# Patient Record
Sex: FEMALE | Race: WHITE | NOT HISPANIC OR LATINO | Employment: OTHER | URBAN - METROPOLITAN AREA
[De-identification: names, ages, dates, MRNs, and addresses within clinical notes are randomized per-mention and may not be internally consistent; named-entity substitution may affect disease eponyms.]

---

## 2017-02-15 ENCOUNTER — APPOINTMENT (OUTPATIENT)
Dept: PHYSICAL THERAPY | Facility: CLINIC | Age: 62
DRG: 194 | End: 2017-02-15
Attending: FAMILY MEDICINE
Payer: COMMERCIAL

## 2017-02-15 ENCOUNTER — HOSPITAL ENCOUNTER (INPATIENT)
Facility: CLINIC | Age: 62
LOS: 1 days | Discharge: HOME HEALTH | DRG: 194 | End: 2017-02-16
Attending: FAMILY MEDICINE | Admitting: FAMILY MEDICINE
Payer: COMMERCIAL

## 2017-02-15 ENCOUNTER — TRANSFERRED RECORDS (OUTPATIENT)
Dept: HEALTH INFORMATION MANAGEMENT | Facility: CLINIC | Age: 62
End: 2017-02-15

## 2017-02-15 DIAGNOSIS — J44.1 OBSTRUCTIVE CHRONIC BRONCHITIS WITH EXACERBATION (H): Primary | ICD-10-CM

## 2017-02-15 DIAGNOSIS — J10.1 INFLUENZA A: ICD-10-CM

## 2017-02-15 DIAGNOSIS — M25.571 RIGHT ANKLE PAIN, UNSPECIFIED CHRONICITY: ICD-10-CM

## 2017-02-15 PROBLEM — A41.9 SEPSIS (H): Status: ACTIVE | Noted: 2017-02-15

## 2017-02-15 LAB
ALBUMIN SERPL-MCNC: 3.1 G/DL (ref 3.4–5)
ALBUMIN UR-MCNC: NEGATIVE MG/DL
ALP SERPL-CCNC: 79 U/L (ref 40–150)
ALT SERPL W P-5'-P-CCNC: 24 U/L (ref 0–50)
ANION GAP SERPL CALCULATED.3IONS-SCNC: 12 MMOL/L (ref 3–14)
APPEARANCE UR: CLEAR
AST SERPL W P-5'-P-CCNC: 18 U/L (ref 0–45)
BASOPHILS # BLD AUTO: 0 10E9/L (ref 0–0.2)
BASOPHILS NFR BLD AUTO: 0.2 %
BILIRUB SERPL-MCNC: 0.4 MG/DL (ref 0.2–1.3)
BILIRUB UR QL STRIP: NEGATIVE
BUN SERPL-MCNC: 12 MG/DL (ref 7–30)
CALCIUM SERPL-MCNC: 8.4 MG/DL (ref 8.5–10.1)
CHLORIDE SERPL-SCNC: 102 MMOL/L (ref 94–109)
CO2 SERPL-SCNC: 23 MMOL/L (ref 20–32)
COLOR UR AUTO: YELLOW
CREAT SERPL-MCNC: 0.64 MG/DL (ref 0.52–1.04)
DIFFERENTIAL METHOD BLD: ABNORMAL
EOSINOPHIL # BLD AUTO: 0 10E9/L (ref 0–0.7)
EOSINOPHIL NFR BLD AUTO: 0 %
ERYTHROCYTE [DISTWIDTH] IN BLOOD BY AUTOMATED COUNT: 13.6 % (ref 10–15)
GFR SERPL CREATININE-BSD FRML MDRD: ABNORMAL ML/MIN/1.7M2
GLUCOSE BLDC GLUCOMTR-MCNC: 282 MG/DL (ref 70–99)
GLUCOSE BLDC GLUCOMTR-MCNC: 299 MG/DL (ref 70–99)
GLUCOSE BLDC GLUCOMTR-MCNC: 343 MG/DL (ref 70–99)
GLUCOSE SERPL-MCNC: 383 MG/DL (ref 70–99)
GLUCOSE UR STRIP-MCNC: >=1000 MG/DL
HCT VFR BLD AUTO: 34 % (ref 35–47)
HGB BLD-MCNC: 11.2 G/DL (ref 11.7–15.7)
HGB UR QL STRIP: NEGATIVE
IMM GRANULOCYTES # BLD: 0 10E9/L (ref 0–0.4)
IMM GRANULOCYTES NFR BLD: 0.2 %
KETONES UR STRIP-MCNC: NEGATIVE MG/DL
LACTATE BLD-SCNC: 4.2 MMOL/L (ref 0.7–2.1)
LEUKOCYTE ESTERASE UR QL STRIP: NEGATIVE
LYMPHOCYTES # BLD AUTO: 1.1 10E9/L (ref 0.8–5.3)
LYMPHOCYTES NFR BLD AUTO: 24.6 %
MCH RBC QN AUTO: 29.9 PG (ref 26.5–33)
MCHC RBC AUTO-ENTMCNC: 32.9 G/DL (ref 31.5–36.5)
MCV RBC AUTO: 91 FL (ref 78–100)
MONOCYTES # BLD AUTO: 0.1 10E9/L (ref 0–1.3)
MONOCYTES NFR BLD AUTO: 1.8 %
NEUTROPHILS # BLD AUTO: 3.3 10E9/L (ref 1.6–8.3)
NEUTROPHILS NFR BLD AUTO: 73.2 %
NITRATE UR QL: NEGATIVE
PH UR STRIP: 6 PH (ref 5–7)
PLATELET # BLD AUTO: 216 10E9/L (ref 150–450)
POTASSIUM SERPL-SCNC: 4.3 MMOL/L (ref 3.4–5.3)
PROT SERPL-MCNC: 7.5 G/DL (ref 6.8–8.8)
RBC # BLD AUTO: 3.75 10E12/L (ref 3.8–5.2)
RBC #/AREA URNS AUTO: ABNORMAL /HPF (ref 0–2)
SODIUM SERPL-SCNC: 137 MMOL/L (ref 133–144)
SP GR UR STRIP: 1.02 (ref 1–1.03)
URN SPEC COLLECT METH UR: ABNORMAL
UROBILINOGEN UR STRIP-ACNC: 0.2 EU/DL (ref 0.2–1)
WBC # BLD AUTO: 4.6 10E9/L (ref 4–11)
WBC #/AREA URNS AUTO: ABNORMAL /HPF (ref 0–2)

## 2017-02-15 PROCEDURE — 25000132 ZZH RX MED GY IP 250 OP 250 PS 637: Performed by: FAMILY MEDICINE

## 2017-02-15 PROCEDURE — 25000125 ZZHC RX 250: Performed by: FAMILY MEDICINE

## 2017-02-15 PROCEDURE — 80053 COMPREHEN METABOLIC PANEL: CPT | Performed by: FAMILY MEDICINE

## 2017-02-15 PROCEDURE — 87040 BLOOD CULTURE FOR BACTERIA: CPT | Performed by: FAMILY MEDICINE

## 2017-02-15 PROCEDURE — 99207 ZZC MOONLIGHTING INDICATOR: CPT | Performed by: FAMILY MEDICINE

## 2017-02-15 PROCEDURE — 00000146 ZZHCL STATISTIC GLUCOSE BY METER IP

## 2017-02-15 PROCEDURE — 97161 PT EVAL LOW COMPLEX 20 MIN: CPT | Mod: GP

## 2017-02-15 PROCEDURE — 36415 COLL VENOUS BLD VENIPUNCTURE: CPT | Performed by: FAMILY MEDICINE

## 2017-02-15 PROCEDURE — 97110 THERAPEUTIC EXERCISES: CPT | Mod: GP

## 2017-02-15 PROCEDURE — 25000131 ZZH RX MED GY IP 250 OP 636 PS 637: Performed by: FAMILY MEDICINE

## 2017-02-15 PROCEDURE — 25000128 H RX IP 250 OP 636: Performed by: FAMILY MEDICINE

## 2017-02-15 PROCEDURE — 12000000 ZZH R&B MED SURG/OB

## 2017-02-15 PROCEDURE — 83605 ASSAY OF LACTIC ACID: CPT | Performed by: FAMILY MEDICINE

## 2017-02-15 PROCEDURE — 97530 THERAPEUTIC ACTIVITIES: CPT | Mod: GP

## 2017-02-15 PROCEDURE — 87081 CULTURE SCREEN ONLY: CPT | Performed by: FAMILY MEDICINE

## 2017-02-15 PROCEDURE — 99223 1ST HOSP IP/OBS HIGH 75: CPT | Mod: AI | Performed by: FAMILY MEDICINE

## 2017-02-15 PROCEDURE — 85025 COMPLETE CBC W/AUTO DIFF WBC: CPT | Performed by: FAMILY MEDICINE

## 2017-02-15 PROCEDURE — 40000193 ZZH STATISTIC PT WARD VISIT

## 2017-02-15 PROCEDURE — 81001 URINALYSIS AUTO W/SCOPE: CPT | Performed by: FAMILY MEDICINE

## 2017-02-15 RX ORDER — ATENOLOL 50 MG/1
50 TABLET ORAL DAILY
COMMUNITY
Start: 2017-02-02

## 2017-02-15 RX ORDER — ALBUTEROL SULFATE 90 UG/1
2 AEROSOL, METERED RESPIRATORY (INHALATION) EVERY 4 HOURS PRN
COMMUNITY
Start: 2017-02-02

## 2017-02-15 RX ORDER — CETIRIZINE HYDROCHLORIDE 10 MG/1
10 TABLET ORAL DAILY
Status: DISCONTINUED | OUTPATIENT
Start: 2017-02-15 | End: 2017-02-16 | Stop reason: HOSPADM

## 2017-02-15 RX ORDER — FUROSEMIDE 20 MG
20 TABLET ORAL EVERY MORNING
Status: DISCONTINUED | OUTPATIENT
Start: 2017-02-15 | End: 2017-02-16 | Stop reason: HOSPADM

## 2017-02-15 RX ORDER — ONDANSETRON 2 MG/ML
4 INJECTION INTRAMUSCULAR; INTRAVENOUS EVERY 6 HOURS PRN
Status: DISCONTINUED | OUTPATIENT
Start: 2017-02-15 | End: 2017-02-16 | Stop reason: HOSPADM

## 2017-02-15 RX ORDER — NICOTINE POLACRILEX 4 MG
15-30 LOZENGE BUCCAL
Status: DISCONTINUED | OUTPATIENT
Start: 2017-02-15 | End: 2017-02-16 | Stop reason: HOSPADM

## 2017-02-15 RX ORDER — AMLODIPINE BESYLATE 10 MG/1
10 TABLET ORAL
Status: DISCONTINUED | OUTPATIENT
Start: 2017-02-15 | End: 2017-02-16 | Stop reason: HOSPADM

## 2017-02-15 RX ORDER — AMOXICILLIN 250 MG
1 CAPSULE ORAL 2 TIMES DAILY PRN
COMMUNITY
Start: 2016-12-22

## 2017-02-15 RX ORDER — CITALOPRAM HYDROBROMIDE 40 MG/1
40 TABLET ORAL DAILY
COMMUNITY
Start: 2017-02-02

## 2017-02-15 RX ORDER — OXYCODONE HYDROCHLORIDE 5 MG/1
1-2 TABLET ORAL 2 TIMES DAILY PRN
COMMUNITY
Start: 2017-02-08

## 2017-02-15 RX ORDER — DEXTROSE MONOHYDRATE 25 G/50ML
25-50 INJECTION, SOLUTION INTRAVENOUS
Status: DISCONTINUED | OUTPATIENT
Start: 2017-02-15 | End: 2017-02-16 | Stop reason: HOSPADM

## 2017-02-15 RX ORDER — IBUPROFEN 600 MG/1
600 TABLET, FILM COATED ORAL EVERY 6 HOURS PRN
Status: DISCONTINUED | OUTPATIENT
Start: 2017-02-15 | End: 2017-02-16 | Stop reason: HOSPADM

## 2017-02-15 RX ORDER — ONDANSETRON 4 MG/1
4 TABLET, ORALLY DISINTEGRATING ORAL EVERY 6 HOURS PRN
Status: DISCONTINUED | OUTPATIENT
Start: 2017-02-15 | End: 2017-02-16 | Stop reason: HOSPADM

## 2017-02-15 RX ORDER — LOVASTATIN 20 MG
20 TABLET ORAL DAILY
COMMUNITY
Start: 2017-02-02

## 2017-02-15 RX ORDER — ALBUTEROL SULFATE 0.83 MG/ML
2.5 SOLUTION RESPIRATORY (INHALATION) EVERY 6 HOURS PRN
Status: DISCONTINUED | OUTPATIENT
Start: 2017-02-15 | End: 2017-02-16 | Stop reason: HOSPADM

## 2017-02-15 RX ORDER — LISINOPRIL 20 MG/1
20 TABLET ORAL DAILY
COMMUNITY
Start: 2017-02-02

## 2017-02-15 RX ORDER — LISINOPRIL 10 MG/1
20 TABLET ORAL DAILY
Status: DISCONTINUED | OUTPATIENT
Start: 2017-02-15 | End: 2017-02-16 | Stop reason: HOSPADM

## 2017-02-15 RX ORDER — OSELTAMIVIR PHOSPHATE 75 MG/1
75 CAPSULE ORAL 2 TIMES DAILY
Status: DISCONTINUED | OUTPATIENT
Start: 2017-02-15 | End: 2017-02-16 | Stop reason: HOSPADM

## 2017-02-15 RX ORDER — CITALOPRAM HYDROBROMIDE 20 MG/1
40 TABLET ORAL DAILY
Status: DISCONTINUED | OUTPATIENT
Start: 2017-02-15 | End: 2017-02-16 | Stop reason: HOSPADM

## 2017-02-15 RX ORDER — PROCHLORPERAZINE MALEATE 5 MG
5-10 TABLET ORAL EVERY 6 HOURS PRN
Status: DISCONTINUED | OUTPATIENT
Start: 2017-02-15 | End: 2017-02-16 | Stop reason: HOSPADM

## 2017-02-15 RX ORDER — ASPIRIN 81 MG/1
81 TABLET ORAL DAILY
COMMUNITY

## 2017-02-15 RX ORDER — AMLODIPINE BESYLATE 10 MG/1
10 TABLET ORAL
COMMUNITY
Start: 2017-02-02

## 2017-02-15 RX ORDER — POLYETHYLENE GLYCOL 3350 17 G/17G
17 POWDER, FOR SOLUTION ORAL DAILY
Status: DISCONTINUED | OUTPATIENT
Start: 2017-02-15 | End: 2017-02-16 | Stop reason: HOSPADM

## 2017-02-15 RX ORDER — ALBUTEROL SULFATE 0.83 MG/ML
2.5 SOLUTION RESPIRATORY (INHALATION) EVERY 6 HOURS PRN
COMMUNITY
Start: 2017-02-13 | End: 2019-04-06

## 2017-02-15 RX ORDER — LAMOTRIGINE 100 MG/1
100 TABLET ORAL 2 TIMES DAILY
Status: DISCONTINUED | OUTPATIENT
Start: 2017-02-15 | End: 2017-02-16 | Stop reason: HOSPADM

## 2017-02-15 RX ORDER — FUROSEMIDE 20 MG
20 TABLET ORAL EVERY MORNING
COMMUNITY
Start: 2017-02-08

## 2017-02-15 RX ORDER — ASPIRIN 81 MG/1
81 TABLET ORAL DAILY
Status: DISCONTINUED | OUTPATIENT
Start: 2017-02-15 | End: 2017-02-16 | Stop reason: HOSPADM

## 2017-02-15 RX ORDER — PROCHLORPERAZINE 25 MG
25 SUPPOSITORY, RECTAL RECTAL EVERY 12 HOURS PRN
Status: DISCONTINUED | OUTPATIENT
Start: 2017-02-15 | End: 2017-02-16 | Stop reason: HOSPADM

## 2017-02-15 RX ORDER — ACETAMINOPHEN 325 MG/1
650 TABLET ORAL EVERY 4 HOURS PRN
Status: DISCONTINUED | OUTPATIENT
Start: 2017-02-15 | End: 2017-02-16 | Stop reason: HOSPADM

## 2017-02-15 RX ORDER — OXYCODONE HYDROCHLORIDE 5 MG/1
5-10 TABLET ORAL 2 TIMES DAILY PRN
Status: DISCONTINUED | OUTPATIENT
Start: 2017-02-15 | End: 2017-02-16

## 2017-02-15 RX ORDER — ALBUTEROL SULFATE 90 UG/1
2 AEROSOL, METERED RESPIRATORY (INHALATION) EVERY 4 HOURS PRN
Status: DISCONTINUED | OUTPATIENT
Start: 2017-02-15 | End: 2017-02-15

## 2017-02-15 RX ORDER — ATENOLOL 50 MG/1
50 TABLET ORAL DAILY
Status: DISCONTINUED | OUTPATIENT
Start: 2017-02-15 | End: 2017-02-16 | Stop reason: HOSPADM

## 2017-02-15 RX ORDER — NALOXONE HYDROCHLORIDE 0.4 MG/ML
.1-.4 INJECTION, SOLUTION INTRAMUSCULAR; INTRAVENOUS; SUBCUTANEOUS
Status: DISCONTINUED | OUTPATIENT
Start: 2017-02-15 | End: 2017-02-16 | Stop reason: HOSPADM

## 2017-02-15 RX ORDER — LEVOFLOXACIN 5 MG/ML
750 INJECTION, SOLUTION INTRAVENOUS EVERY 24 HOURS
Status: DISCONTINUED | OUTPATIENT
Start: 2017-02-15 | End: 2017-02-16 | Stop reason: HOSPADM

## 2017-02-15 RX ORDER — PREDNISONE 20 MG/1
60 TABLET ORAL DAILY
Status: DISCONTINUED | OUTPATIENT
Start: 2017-02-15 | End: 2017-02-16 | Stop reason: HOSPADM

## 2017-02-15 RX ORDER — LAMOTRIGINE 100 MG/1
100 TABLET ORAL 2 TIMES DAILY
COMMUNITY
Start: 2017-02-08

## 2017-02-15 RX ORDER — SIMVASTATIN 5 MG
10 TABLET ORAL DAILY
Status: DISCONTINUED | OUTPATIENT
Start: 2017-02-15 | End: 2017-02-16 | Stop reason: HOSPADM

## 2017-02-15 RX ORDER — POLYETHYLENE GLYCOL 3350 17 G/17G
17 POWDER, FOR SOLUTION ORAL DAILY
COMMUNITY

## 2017-02-15 RX ADMIN — FUROSEMIDE 20 MG: 20 TABLET ORAL at 10:33

## 2017-02-15 RX ADMIN — OXYCODONE HYDROCHLORIDE 10 MG: 5 TABLET ORAL at 11:17

## 2017-02-15 RX ADMIN — OYSTER SHELL CALCIUM WITH VITAMIN D 1 TABLET: 500; 200 TABLET, FILM COATED ORAL at 17:39

## 2017-02-15 RX ADMIN — PREDNISONE 60 MG: 20 TABLET ORAL at 11:09

## 2017-02-15 RX ADMIN — TAZOBACTAM SODIUM AND PIPERACILLIN SODIUM 4.5 G: 500; 4 INJECTION, SOLUTION INTRAVENOUS at 12:21

## 2017-02-15 RX ADMIN — TAZOBACTAM SODIUM AND PIPERACILLIN SODIUM 4.5 G: 500; 4 INJECTION, SOLUTION INTRAVENOUS at 23:18

## 2017-02-15 RX ADMIN — OSELTAMIVIR PHOSPHATE 75 MG: 75 CAPSULE ORAL at 21:05

## 2017-02-15 RX ADMIN — ATENOLOL 50 MG: 50 TABLET ORAL at 10:32

## 2017-02-15 RX ADMIN — LEVOFLOXACIN 750 MG: 5 INJECTION, SOLUTION INTRAVENOUS at 10:42

## 2017-02-15 RX ADMIN — METFORMIN HYDROCHLORIDE 1000 MG: 500 TABLET ORAL at 11:09

## 2017-02-15 RX ADMIN — CITALOPRAM HYDROBROMIDE 40 MG: 20 TABLET ORAL at 10:32

## 2017-02-15 RX ADMIN — INSULIN ASPART 15 UNITS: 100 INJECTION, SOLUTION INTRAVENOUS; SUBCUTANEOUS at 17:47

## 2017-02-15 RX ADMIN — AMLODIPINE BESYLATE 10 MG: 10 TABLET ORAL at 17:38

## 2017-02-15 RX ADMIN — TAZOBACTAM SODIUM AND PIPERACILLIN SODIUM 4.5 G: 500; 4 INJECTION, SOLUTION INTRAVENOUS at 17:37

## 2017-02-15 RX ADMIN — METFORMIN HYDROCHLORIDE 1000 MG: 500 TABLET ORAL at 17:37

## 2017-02-15 RX ADMIN — LISINOPRIL 20 MG: 10 TABLET ORAL at 11:10

## 2017-02-15 RX ADMIN — INSULIN ASPART 15 UNITS: 100 INJECTION, SOLUTION INTRAVENOUS; SUBCUTANEOUS at 12:24

## 2017-02-15 RX ADMIN — OSELTAMIVIR PHOSPHATE 75 MG: 75 CAPSULE ORAL at 11:11

## 2017-02-15 RX ADMIN — SIMVASTATIN 10 MG: 5 TABLET, FILM COATED ORAL at 11:09

## 2017-02-15 RX ADMIN — OXYCODONE HYDROCHLORIDE 10 MG: 5 TABLET ORAL at 21:10

## 2017-02-15 RX ADMIN — LAMOTRIGINE 100 MG: 100 TABLET ORAL at 21:05

## 2017-02-15 RX ADMIN — FLUTICASONE PROPIONATE 1 PUFF: 250 POWDER, METERED RESPIRATORY (INHALATION) at 10:43

## 2017-02-15 RX ADMIN — LAMOTRIGINE 100 MG: 100 TABLET ORAL at 10:43

## 2017-02-15 RX ADMIN — OMEPRAZOLE 20 MG: 20 CAPSULE, DELAYED RELEASE ORAL at 11:11

## 2017-02-15 RX ADMIN — FLUTICASONE PROPIONATE 1 PUFF: 250 POWDER, METERED RESPIRATORY (INHALATION) at 17:39

## 2017-02-15 RX ADMIN — ASPIRIN 81 MG: 81 TABLET, COATED ORAL at 10:31

## 2017-02-15 ASSESSMENT — PAIN DESCRIPTION - DESCRIPTORS: DESCRIPTORS: ACHING

## 2017-02-15 ASSESSMENT — ACTIVITIES OF DAILY LIVING (ADL)
SWALLOWING: 0-->SWALLOWS FOODS/LIQUIDS WITHOUT DIFFICULTY
BATHING: 0-->INDEPENDENT
COGNITION: 0 - NO COGNITION ISSUES REPORTED
TRANSFERRING: 3-->ASSISTIVE EQUIPMENT AND PERSON
RETIRED_COMMUNICATION: 0-->UNDERSTANDS/COMMUNICATES WITHOUT DIFFICULTY
TOILETING: 2-->ASSISTIVE PERSON
DRESS: 0-->INDEPENDENT
AMBULATION: 3-->ASSISTIVE EQUIPMENT AND PERSON
RETIRED_EATING: 0-->INDEPENDENT
FALL_HISTORY_WITHIN_LAST_SIX_MONTHS: NO

## 2017-02-15 NOTE — PLAN OF CARE
Problem: Goal Outcome Summary  Goal: Goal Outcome Summary  Patient is a 61 year old year old female. Prior to admission, patient was living alone in an apartment with 15 stairs to enter, using wheelchair and front wheeled walker for mobility, and requiring Minimal assist for ADLs including laundry, cooking, cleaning. Currently, patient is requiring Stand by assistance for functional mobility and Stand by for ambulation using front wheeled walker. Barriers to return to previous living situation include None anticipated.  Patient equipment needs at discharge include None anticipated.  Recommend discharge to Home with resuming her prior Home PT with friend/neighbor transport.      Will plan on seeing pt tomorrow 1x to perform stair training to ensure safe discharge home, however pt appears to be near baseline status of mobility.        Freddie Bolivar, PT, DPT        2/15/2017      5:03 PM  [facility]  [phone]

## 2017-02-15 NOTE — H&P
[facility]    History and Physical  Hospitalist       Date of Admission:  2/15/2017    Assessment & Plan   Daly Walker is a 61 year old female who presents with Sepsis, influenza A and COPD exacerbation.    Active Problems:    1.  Sepsis (H)    Assessment: She presented with cough and fever and workup in the ER showed influenza A. She also displayed symptoms of COPD exacerbation. Her lactic acid level is elevated. He is not tachycardic and her O2 sat has been stable and normal. She was in a rehabilitation facility about 3.5 months and was discharged to home about 3 weeks ago.      Plan: She was admitted to the hospital for aggressive monitoring and management. Vital signs are stable. Pending for blood cultures. Chest x-ray in the ER was normal. She was started on antibiotic when she was in the ER. Will check UA. Broad spectrum antibiotic initiated until the source is identified. Monitored the lactic acid level closely.     2.   Influenza A    Assessment: She did not received the flu vaccination this year. She did not want any vaccination for now.  No respiratory distress.    Plan: She was started on Tamiflu and will continue with that. Will continue to monitor closely as well.     3.   COPD exacerbation (H)    Assessment: She was wheezing and was having some shortness of breath went to work in the ER. She was given a dose of follow marginal. She in feeling better. Her O2 sat above 92% at room air. Chest no chest pain or shortness of breath. She is not in an acute respiratory distress. Her lactic acid level is elevated. Chest x-ray was normal in the ER.    Plan: She is beingl treated for sepsis with Levaquin and Zosyn. She was in rehabilitation facility for 3.5 months before she would discharge for 3 weeks ago. Was started on doing them and albuterol nebulizer as needed. Stopped the Solumedrol and start her on Prednisone. Oxygen as needed to maintain O2 sat around 93-94%. Repeat  chest x-ray in the morning.      4.  Essential hypertension, benign    Assessment: Blood pressure was high in the ER. It is trending down. She is on multiple medications for this.    Plan: Continue with her outpatient medications which include Norvasc, atenolol and lisinopril. Will monitor her blood pressure closely and will readjust the medication dose appropriate.      5. TYPE 2 DIABETES, HBA1C GOAL < 7%    Assessment: States that her diabetes has been controlled at home. He is on multiple medications for this.  She does not want to be on diabetic diet and stated that she will not eat if she is put on diabetic diet..    Plan: Continue with her outpatient medications which include the metformin, glargine, exenatide and NovoLog when necessary. As per her request, regular diet was ordered. Monitor her blood sugar closely.      6.  Ankle pain    Assessment: She had the ankle surgery recently. She was in rehabilitation for 3-1/2 month and will discharge home about 3 weeks ago. She has home physical therapy about 2 times a week. She feels unstable and use the walker when she ambulates. She is taking oxycodone as needed for pain.     Plan: For prevention discussed and recommend her to use a walker with ambulation. Physical therapy will consult for further evaluation and management. Continue with the oxycodone as needed for pain.      7. Seizure disorder (H)    Assessment: Last seizure episode was years ago. She is on Lamictal.    Plan: Continue with the Lamictal. Seizure precautions discussed.     We'll order medical conditions are stable. We'll continue with her outpatient medications.    DVT Prophylaxis: Pneumatic Compression Devices and Ambulate every shift  Code Status: Full Code    Disposition: Expected discharge in 2-3 days once.    Margo Méndez Mai    Primary Care Physician   [facility] in [place]     Chief Complaint   Fever, coughing with shortness of breath     History is obtained from the patient    History of  Present Illness   Daly Walker is a 61 year old female who presented to the emergency room at [facility] earlier this morning for coughing, fevers, sore throat and shortness of breath. She is known to have COPD which normally controlled with Qvar and when necessary inhalers. She was doing well until 2 days ago, when she started having the runny nose, nasal congestion with body ache. She also has the fever.  Denies of headache or dizziness. He was coughing which was nonproductive but congested. She was wheezing and feels shortness of breath. She was dyspneic as well. No chest tightness sensation. No leg swelling and denies orthopnea. She was using the nebulizer at home but didn't seem to help much and therefore she went to the emergency room. She was eating and drinking well, no nausea or vomiting. No diarrhea or constipation and denies of urinary symptoms. No leg swelling. No exposure to any acute illnesses. She does not smoke and there was no recent history of long distance traveling. He just got home about 3 weeks ago from the rehabilitation where she stayed for 3.5 months.  She had a foot surgery/reconstruction with no complication.    He was evaluated extensively in the ER at [facility]. Workup showed normal x-ray but influenza A was positive. She was not hypoxic although her blood pressure was slightly elevated. Workup shows that she had elevated lactic acid with CBC and CMP were normal. She was diagnosed with sepsis, influenza A, COPD exacerbation. She was treated with antibiotic, Tamiflu and Solumedrol in the ER. She was transferred to our facility because [facility] is currently full. Her primary care provider is an [facility] physician in [place].    States that she is feeling better since the treatment in the ER. She still has the sore throat. Denies of chest pain or shortness of breath. Stated that she lives alone andshe wants to stay here for at least couple days. She does not want to be on  diabetic diet - will only eat regular diet. Stated that her diabetes is well controlled at home. She does not want any time of immunization include Pneumovax.    Past Medical History    I have reviewed this patient's medical history and updated it with pertinent information if needed.   Past Medical History   Diagnosis Date     Cerebral infarction (H)      Chronic back pain      COPD (chronic obstructive pulmonary disease) (H)      Depressive disorder      Hypertension      Mixed hyperlipidemia      Obesity      Moderate     Other anxiety states      Seizure (H)      Syncope 8/2/10     Acute     Type II or unspecified type diabetes mellitus without mention of complication, uncontrolled        Past Surgical History   I have reviewed this patient's surgical history and updated it with pertinent information if needed.  Past Surgical History   Procedure Laterality Date     Hysterectomy, vaginal       sparing ovaries     Colonoscopy  05/18/09     Cholecystectomy, laporoscopic  2000     Tonsillectomy  1955     C/section, classical  1972, 1973     Hemorrhoidectomy  03/11/10     Internal and external     Orthopedic surgery       foot surgery       Prior to Admission Medications   Prior to Admission Medications   Prescriptions Last Dose Informant Patient Reported? Taking?   albuterol (2.5 MG/3ML) 0.083% neb solution   Yes Yes   Sig: Inhale 2.5 mg into the lungs every 6 hours as needed   albuterol (PROAIR HFA/PROVENTIL HFA/VENTOLIN HFA) 108 (90 BASE) MCG/ACT Inhaler   Yes Yes   Sig: Inhale 2 puffs into the lungs every 4 hours as needed   amLODIPine (NORVASC) 10 MG tablet   Yes Yes   Sig: Take 10 mg by mouth daily (with dinner)   aspirin EC 81 MG EC tablet   Yes No   Sig: Take 81 mg by mouth daily   atenolol (TENORMIN) 50 MG tablet   Yes Yes   Sig: Take 50 mg by mouth daily   beclomethasone (QVAR) 80 MCG/ACT Inhaler   Yes Yes   Sig: Inhale 1 puff into the lungs 2 times daily   calcium carb 1250 mg, 500 mg [place],/vitamin D 200  units (OSCAL WITH D) 500-200 MG-UNIT per tablet   Yes Yes   Sig: Take 1 tablet by mouth daily (with dinner)   citalopram (CELEXA) 40 MG tablet   Yes Yes   Sig: Take 40 mg by mouth daily   exenatide ER (BYDUREON) 2 MG pen   Yes Yes   Sig: Inject 2 mg Subcutaneous Every Tuesday   furosemide (LASIX) 20 MG tablet   Yes Yes   Sig: Take 20 mg by mouth every morning   insulin aspart (NOVOLOG PEN) 100 UNIT/ML injection   Yes Yes   Sig: Inject 15 Units Subcutaneous   insulin glargine U-300 (TOUJEO) 300 UNIT/ML injection   Yes Yes   Sig: Inject 60 Units Subcutaneous every morning (before breakfast)   lamoTRIgine (LAMICTAL) 100 MG tablet   Yes Yes   Sig: Take 100 mg by mouth 2 times daily   linagliptin (TRADJENTA) 5 MG TABS tablet   Yes Yes   Sig: Take 5 mg by mouth daily   lisinopril (PRINIVIL/ZESTRIL) 20 MG tablet   Yes Yes   Sig: Take 20 mg by mouth daily   lovastatin (MEVACOR) 20 MG tablet   Yes Yes   Sig: Take 20 mg by mouth daily   metFORMIN (GLUCOPHAGE) 1000 MG tablet   Yes Yes   Sig: Take 1,000 mg by mouth 2 times daily (with meals)   omeprazole (PRILOSEC) 20 MG CR capsule   Yes Yes   Sig: Take 20 mg by mouth daily   oxyCODONE (ROXICODONE) 5 MG IR tablet   Yes Yes   Sig: Take 1-2 tablets by mouth 2 times daily as needed   polyethylene glycol (MIRALAX/GLYCOLAX) powder   Yes No   Sig: Take 17 g by mouth daily   senna-docusate (SENOKOT-S;PERICOLACE) 8.6-50 MG per tablet   Yes Yes   Sig: Take 1 tablet by mouth 2 times daily as needed      Facility-Administered Medications: None     Allergies   No Known Allergies    Social History   I have reviewed this patient's social history and updated it with pertinent information if needed. Daly SIMI Walker  reports that she has never smoked. She does not have any smokeless tobacco history on file. She reports that she drinks alcohol. She reports that she does not use illicit drugs.    Family History   I have reviewed this patient's family history and updated it with pertinent  information if needed.   Family History   Problem Relation Age of Onset     DIABETES Mother      type II     Cardiovascular Mother      CHF     HEART DISEASE Mother      CHF     DIABETES Sister      type II     DIABETES Sister      type 2     DIABETES Maternal Grandmother      CANCER Maternal Grandfather      Unknown/Adopted Paternal Grandmother      Unknown/Adopted Paternal Grandfather      DIABETES Maternal Aunt      type II     DIABETES Other      cousin on maternal side, type II     Asthma No family hx of      C.A.D. No family hx of      Hypertension No family hx of      CEREBROVASCULAR DISEASE No family hx of      Breast Cancer No family hx of      Cancer - colorectal No family hx of      Prostate Cancer No family hx of      Alzheimer Disease No family hx of      Arthritis No family hx of      Blood Disease No family hx of      Circulatory No family hx of      Eye Disorder No family hx of      GASTROINTESTINAL DISEASE No family hx of      Genitourinary Problems No family hx of      Lipids No family hx of      Musculoskeletal Disorder No family hx of      Neurologic Disorder No family hx of      Respiratory No family hx of      Thyroid Disease No family hx of        Review of Systems   The 10 point Review of Systems is negative other than noted in the HPI or here. Home, could not have just try to finish the H&P and looking    Physical Exam   Temp: 96.9  F (36.1  C) Temp src: Oral BP: 141/79 Pulse: 88   Resp: 22 SpO2: 96 % O2 Device: None (Room air)    Vital Signs with Ranges  Temp:  [96.9  F (36.1  C)] 96.9  F (36.1  C)  Pulse:  [88] 88  Resp:  [22] 22  BP: (141)/(79) 141/79  SpO2:  [96 %] 96 %  210 lbs 8.63 oz    Constitutional: Alert, pleasant, comfortable in bed. Speak in full sentences. She was not on oxygen.   HEENT: TMs are pearly white. Nares are congested with clear drainage. Nasal mucosa is pale and edematous. Oropharynx is pink and moist, no tonsillar hypertrophy, exudate or erythema. No tender with  palpation to the sinuses. Neck is supple, no lymphadenopathy or thyromegaly. No tender with palpation to the cervical spine.   Respiratory: Clear with good respiratory effort throughout. No wheezes or crackle.   Cardiovascular: Regular rate and rhythm, no murmur. No peripheral edema. Pedal pulses bilaterally.   GI: Soft, nondistended but obese. Nontender with palpation. Normal bowel sounds.   Lymph/Hematologic: No lymphadenopathy.   Musculoskeletal: All 4 extremities equally and strength. Again no peripheral edema.   Neurologic: No focal neurological deficit, cranial nerves II through XII are intact. DTR +2/2 throughout.   Psychiatric: Dress appropriate. Her speech was normal, easily comprehended. Thoughts are intact, no suicidal or homicidal ideation. No hallucination.   Data   Data reviewed today:  I personally reviewed no images or EKG's today.  No lab results found in last 7 days.    No results found for this or any previous visit (from the past 24 hour(s)).

## 2017-02-15 NOTE — IP AVS SNAPSHOT
[address] [facility]    [address] [place] [address]    Phone:  [phone]                                       After Visit Summary   2/15/2017    Daly Walker    MRN: [id number]           After Visit Summary Signature Page     I have received my discharge instructions, and my questions have been answered. I have discussed any challenges I see with this plan with the nurse or doctor.    ..........................................................................................................................................  Patient/Patient Representative Signature      ..........................................................................................................................................  Patient Representative Print Name and Relationship to Patient    ..................................................               ................................................  Date                                            Time    ..........................................................................................................................................  Reviewed by Signature/Title    ...................................................              ..............................................  Date                                                            Time

## 2017-02-15 NOTE — IP AVS SNAPSHOT
MRN:[id number]                      After Visit Summary   2/15/2017    Daly Walker    MRN: [id number]           Thank you!     Thank you for choosing [facility] for your care. Our goal is always to provide you with excellent care. Hearing back from our patients is one way we can continue to improve our services. Please take a few minutes to complete the written survey that you may receive in the mail after you visit with us. Thank you!        Patient Information     Date Of Birth          1955        About your hospital stay     You were admitted on:  February 15, 2017 You last received care in the:  [address]    You were discharged on:  February 16, 2017        Reason for your hospital stay       Daly was admitted for Influenza A infection with COPD exacerbation and elevated lactic acid level - concerning of sepsis.                  Who to Call     For medical emergencies, please call 911.  For non-urgent questions about your medical care, please call your primary care provider or clinic, [phone]          Attending Provider     Provider Specialty    Servando Garcia MD [facility]    Edie, Margo Méndez MD [facility]       Primary Care Provider Office Phone # Fax #    Jamey Chase [phone]       [address]  [place] [address]         When to contact your care team       Call your primary doctor if you have any of the following: temperature greater than 101 or less than 95 increased shortness of breath or increased swelling.                  After Care Instructions     Activity       Your activity upon discharge: activity as tolerated with walker.            Diet       Follow this diet upon discharge: Orders Placed This Encounter      Advance Diet as Tolerated: Diabetic Diet Adult            Discharge Instructions                 Follow-up Appointments     Follow-up and recommended labs and  "tests        Follow up with primary care provider, Jamey Chase, within 7 days for hospital follow- up.  No follow up labs or test are needed.                  Additional Services     Home Care PT Referral for Hospital Discharge       PT to eval and treat    Your provider has ordered home care - physical therapy. If you have not been contacted within 2 days of your discharge please call the department phone number listed on the top of this document.                  Pending Results     Date and Time Order Name Status Description    2/15/2017 0949 Blood culture Preliminary     2/15/2017 0949 Blood culture Preliminary             Statement of Approval     Ordered          17 1609  I have reviewed and agree with all the recommendations and orders detailed in this document.  EFFECTIVE NOW     Approved and electronically signed by:  Margo Irizarry MD             Admission Information     Date & Time Provider Department Dept. Phone    2/15/2017 Margo Irizarry MD [address] [facility] [phone]      Your Vitals Were     Blood Pressure Pulse Temperature Respirations Height Weight    148/77 (BP Location: Left arm) 77 97.3  F (36.3  C) (Oral) 16 1.626 m (5' 4\") 95.5 kg (210 lb 8.6 oz)    Pulse Oximetry BMI (Body Mass Index)                94% 36.14 kg/m2          MyChart Information     [facility] lets you send messages to your doctor, view your test results, renew your prescriptions, schedule appointments and more. To sign up, go to [email or web address] . Click on \"Log in\" on the left side of the screen, which will take you to the Welcome page. Then click on \"Sign up Now\" on the right side of the page.     You will be asked to enter the access code listed below, as well as some personal information. Please follow the directions to create your username and password.     Your access code is: [email or web address]  Expires: 2017  4:18 PM     Your access code will  in 90 days. If you need help or a new " code, please call your [facility] clinic or [phone].        Care EveryWhere ID     This is your Care EveryWhere ID. This could be used by other organizations to access your [facility] medical records  [id number]           Review of your medicines      START taking        Dose / Directions    cetirizine 10 MG tablet   Commonly known as:  zyrTEC   Used for:  Obstructive chronic bronchitis with exacerbation (H)        Dose:  10 mg   Take 1 tablet (10 mg) by mouth daily   Quantity:  30 tablet   Refills:  0       levofloxacin 750 MG tablet   Commonly known as:  LEVAQUIN   Used for:  Obstructive chronic bronchitis with exacerbation (H)        Dose:  750 mg   Take 1 tablet (750 mg) by mouth daily   Quantity:  5 tablet   Refills:  0       oseltamivir 75 MG capsule   Commonly known as:  TAMIFLU   Indication:  Flu        Dose:  75 mg   Take 1 capsule (75 mg) by mouth 2 times daily   Quantity:  10 capsule   Refills:  0       predniSONE 20 MG tablet   Commonly known as:  DELTASONE   Used for:  Obstructive chronic bronchitis with exacerbation (H)        Dose:  60 mg   Take 3 tablets (60 mg) by mouth daily for 4 days   Quantity:  12 tablet   Refills:  0         CONTINUE these medicines which have NOT CHANGED        Dose / Directions    * albuterol 108 (90 BASE) MCG/ACT Inhaler   Commonly known as:  PROAIR HFA/PROVENTIL HFA/VENTOLIN HFA        Dose:  2 puff   Inhale 2 puffs into the lungs every 4 hours as needed   Refills:  0       * albuterol (2.5 MG/3ML) 0.083% neb solution        Dose:  2.5 mg   Inhale 2.5 mg into the lungs every 6 hours as needed   Refills:  0       amLODIPine 10 MG tablet   Commonly known as:  NORVASC        Dose:  10 mg   Take 10 mg by mouth daily (with dinner)   Refills:  0       aspirin EC 81 MG EC tablet        Dose:  81 mg   Take 81 mg by mouth daily   Refills:  0       atenolol 50 MG tablet   Commonly known as:  TENORMIN        Dose:  50 mg   Take 50 mg by mouth daily   Refills:  0        beclomethasone 80 MCG/ACT Inhaler   Commonly known as:  QVAR        Dose:  1 puff   Inhale 1 puff into the lungs 2 times daily   Refills:  0       calcium carb 1250 mg (500 mg [place])/vitamin D 200 units 500-200 MG-UNIT per tablet   Commonly known as:  OSCAL with D        Dose:  1 tablet   Take 1 tablet by mouth daily (with dinner)   Refills:  0       citalopram 40 MG tablet   Commonly known as:  celeXA        Dose:  40 mg   Take 40 mg by mouth daily   Refills:  0       exenatide ER 2 MG pen   Commonly known as:  BYDUREON        Dose:  2 mg   Inject 2 mg Subcutaneous Every Tuesday   Refills:  0       furosemide 20 MG tablet   Commonly known as:  LASIX        Dose:  20 mg   Take 20 mg by mouth every morning   Refills:  0       insulin aspart 100 UNIT/ML injection   Commonly known as:  NovoLOG PEN        Dose:  15 Units   Inject 15 Units Subcutaneous   Refills:  0       insulin glargine U-300 300 UNIT/ML injection   Commonly known as:  TOUJEO        Dose:  60 Units   Inject 60 Units Subcutaneous every morning (before breakfast)   Refills:  0       lamoTRIgine 100 MG tablet   Commonly known as:  LaMICtal        Dose:  100 mg   Take 100 mg by mouth 2 times daily   Refills:  0       linagliptin 5 MG Tabs tablet   Commonly known as:  TRADJENTA        Dose:  5 mg   Take 5 mg by mouth daily   Refills:  0       lisinopril 20 MG tablet   Commonly known as:  PRINIVIL/ZESTRIL        Dose:  20 mg   Take 20 mg by mouth daily   Refills:  0       lovastatin 20 MG tablet   Commonly known as:  MEVACOR        Dose:  20 mg   Take 20 mg by mouth daily   Refills:  0       metFORMIN 1000 MG tablet   Commonly known as:  GLUCOPHAGE        Dose:  1000 mg   Take 1,000 mg by mouth 2 times daily (with meals)   Refills:  0       omeprazole 20 MG CR capsule   Commonly known as:  priLOSEC        Dose:  20 mg   Take 20 mg by mouth daily   Refills:  0       oxyCODONE 5 MG IR tablet   Commonly known as:  ROXICODONE        Dose:  1-2 tablet   Take 1-2  tablets by mouth 2 times daily as needed   Refills:  0       polyethylene glycol powder   Commonly known as:  MIRALAX/GLYCOLAX        Dose:  17 g   Take 17 g by mouth daily   Refills:  0       senna-docusate 8.6-50 MG per tablet   Commonly known as:  SENOKOT-S;PERICOLACE        Dose:  1 tablet   Take 1 tablet by mouth 2 times daily as needed   Refills:  0       * Notice:  This list has 2 medication(s) that are the same as other medications prescribed for you. Read the directions carefully, and ask your doctor or other care provider to review them with you.         Where to get your medicines      These medications were sent to [facility], [place] - [address]   [address] , [place] [address]     Phone:  [phone]     cetirizine 10 MG tablet    levofloxacin 750 MG tablet    oseltamivir 75 MG capsule    predniSONE 20 MG tablet                Protect others around you: Learn how to safely use, store and throw away your medicines at www.disposemymeds.org.             Medication List: This is a list of all your medications and when to take them. Check marks below indicate your daily home schedule. Keep this list as a reference.      Medications           Morning Afternoon Evening Bedtime As Needed    * albuterol 108 (90 BASE) MCG/ACT Inhaler   Commonly known as:  PROAIR HFA/PROVENTIL HFA/VENTOLIN HFA   Inhale 2 puffs into the lungs every 4 hours as needed                                   * albuterol (2.5 MG/3ML) 0.083% neb solution   Inhale 2.5 mg into the lungs every 6 hours as needed                                   amLODIPine 10 MG tablet   Commonly known as:  NORVASC   Take 10 mg by mouth daily (with dinner)   Last time this was given:  10 mg on 2/15/2017  5:38 PM   Next Dose Due:  With supper                                   aspirin EC 81 MG EC tablet   Take 81 mg by mouth daily   Last time this was given:  81 mg on 2/16/2017  9:05 AM   Next Dose Due:  Tomorrow morning                                    atenolol 50 MG tablet   Commonly known as:  TENORMIN   Take 50 mg by mouth daily   Last time this was given:  50 mg on 2/16/2017  9:03 AM   Next Dose Due:  Tomorrow morning                                   beclomethasone 80 MCG/ACT Inhaler   Commonly known as:  QVAR   Inhale 1 puff into the lungs 2 times daily   Next Dose Due:  Tomorrow morning                                      calcium carb 1250 mg (500 mg [place])/vitamin D 200 units 500-200 MG-UNIT per tablet   Commonly known as:  OSCAL with D   Take 1 tablet by mouth daily (with dinner)   Last time this was given:  1 tablet on 2/15/2017  5:39 PM   Next Dose Due:  With supper                                   cetirizine 10 MG tablet   Commonly known as:  zyrTEC   Take 1 tablet (10 mg) by mouth daily   Last time this was given:  10 mg on 2/16/2017  9:05 AM   Next Dose Due:  Tomorrow morning                                   citalopram 40 MG tablet   Commonly known as:  celeXA   Take 40 mg by mouth daily   Last time this was given:  40 mg on 2/16/2017  9:04 AM   Next Dose Due:  Tomorrow morning                                   exenatide ER 2 MG pen   Commonly known as:  BYDUREON   Inject 2 mg Subcutaneous Every Tuesday   Next Dose Due:  Next Tuesday                                furosemide 20 MG tablet   Commonly known as:  LASIX   Take 20 mg by mouth every morning   Last time this was given:  20 mg on 2/16/2017  9:05 AM   Next Dose Due:  Tomorrow morning                                   insulin aspart 100 UNIT/ML injection   Commonly known as:  NovoLOG PEN   Inject 15 Units Subcutaneous   Last time this was given:  15 Units on 2/16/2017 11:40 AM   Next Dose Due:  With supper if blood sugar is more than 100                                         insulin glargine U-300 300 UNIT/ML injection   Commonly known as:  TOUJEO   Inject 60 Units Subcutaneous every morning (before breakfast)   Last time this was given:  60 Units on 2/16/2017   7:46 AM   Next Dose Due:  Tomorrow morning                                   lamoTRIgine 100 MG tablet   Commonly known as:  LaMICtal   Take 100 mg by mouth 2 times daily   Last time this was given:  100 mg on 2/16/2017  9:05 AM   Next Dose Due:  This evening                                      levofloxacin 750 MG tablet   Commonly known as:  LEVAQUIN   Take 1 tablet (750 mg) by mouth daily                                linagliptin 5 MG Tabs tablet   Commonly known as:  TRADJENTA   Take 5 mg by mouth daily   Last time this was given:  5 mg on 2/16/2017 11:01 AM   Next Dose Due:  Tomorrow morning                                   lisinopril 20 MG tablet   Commonly known as:  PRINIVIL/ZESTRIL   Take 20 mg by mouth daily   Last time this was given:  20 mg on 2/16/2017  9:04 AM   Next Dose Due:  Tomorrow morning                                   lovastatin 20 MG tablet   Commonly known as:  MEVACOR   Take 20 mg by mouth daily   Next Dose Due:  This evening                                   metFORMIN 1000 MG tablet   Commonly known as:  GLUCOPHAGE   Take 1,000 mg by mouth 2 times daily (with meals)   Last time this was given:  1,000 mg on 2/16/2017  9:04 AM   Next Dose Due:  With supper                                      omeprazole 20 MG CR capsule   Commonly known as:  priLOSEC   Take 20 mg by mouth daily   Last time this was given:  20 mg on 2/16/2017  9:03 AM   Next Dose Due:  Tomorrow morning                                   oseltamivir 75 MG capsule   Commonly known as:  TAMIFLU   Take 1 capsule (75 mg) by mouth 2 times daily   Last time this was given:  75 mg on 2/16/2017  9:03 AM   Next Dose Due:  This evening                                      oxyCODONE 5 MG IR tablet   Commonly known as:  ROXICODONE   Take 1-2 tablets by mouth 2 times daily as needed   Last time this was given:  10 mg on 2/16/2017  4:54 AM                                   polyethylene glycol powder   Commonly known as:  MIRALAX/GLYCOLAX    Take 17 g by mouth daily   Next Dose Due:  Tomorrow morning                                   predniSONE 20 MG tablet   Commonly known as:  DELTASONE   Take 3 tablets (60 mg) by mouth daily for 4 days   Last time this was given:  60 mg on 2/16/2017  9:04 AM   Next Dose Due:  Tomorrow morning                                   senna-docusate 8.6-50 MG per tablet   Commonly known as:  SENOKOT-S;PERICOLACE   Take 1 tablet by mouth 2 times daily as needed                                   * Notice:  This list has 2 medication(s) that are the same as other medications prescribed for you. Read the directions carefully, and ask your doctor or other care provider to review them with you.              More Information        Patient Education    Levofloxacin Ophthalmic drops, solution    Levofloxacin Oral solution    Levofloxacin Oral tablet    Levofloxacin Solution for injection    Levofloxacin, Dextrose Solution for injection  Levofloxacin Oral tablet  What is this medicine?  LEVOFLOXACIN (willy voe FLOX a sin) is a quinolone antibiotic. It is used to treat certain kinds of bacterial infections. It will not work for colds, flu, or other viral infections.  This medicine may be used for other purposes; ask your health care provider or pharmacist if you have questions.  What should I tell my health care provider before I take this medicine?  They need to know if you have any of these conditions:    cerebral disease    irregular heartbeat    kidney disease    seizure disorder    an unusual or allergic reaction to levofloxacin, other antibiotics or medicines, foods, dyes, or preservatives    pregnant or trying to get pregnant    breast-feeding  How should I use this medicine?  Take this medicine by mouth with a full glass of water. Follow the directions on the prescription label. This medicine can be taken with or without food. Take your medicine at regular intervals. Do not take your medicine more often than directed. Do not  skip doses or stop your medicine early even if you feel better. Do not stop taking except on your doctor's advice.  A special MedGuide will be given to you by the pharmacist with each prescription and refill. Be sure to read this information carefully each time.  Talk to your pediatrician regarding the use of this medicine in children. While this drug may be prescribed for children as young as 6 months for selected conditions, precautions do apply.  Overdosage: If you think you have taken too much of this medicine contact a poison control center or emergency room at once.  NOTE: This medicine is only for you. Do not share this medicine with others.  What if I miss a dose?  If you miss a dose, take it as soon as you remember. If it is almost time for your next dose, take only that dose. Do not take double or extra doses.  What may interact with this medicine?  Do not take this medicine with any of the following medications:    arsenic trioxide    chloroquine    droperidol    medicines for irregular heart rhythm like amiodarone, disopyramide, dofetilide, flecainide, quinidine, procainamide, sotalol    some medicines for depression or mental problems like phenothiazines, pimozide, and ziprasidone  This medicine may also interact with the following medications:    amoxapine    antacids    cisapride    dairy products    didanosine (ddI) buffered tablets or powder    haloperidol    multivitamins    NSAIDS, medicines for pain and inflammation, like ibuprofen or naproxen    retinoid products like tretinoin or isotretinoin    risperidone    some other antibiotics like clarithromycin or erythromycin    sucralfate    theophylline    warfarin  This list may not describe all possible interactions. Give your health care provider a list of all the medicines, herbs, non-prescription drugs, or dietary supplements you use. Also tell them if you smoke, drink alcohol, or use illegal drugs. Some items may interact with your  medicine.  What should I watch for while using this medicine?  Tell your doctor or health care professional if your symptoms do not improve or if they get worse. Drink several glasses of water a day and cut down on drinks that contain caffeine. You must not get dehydrated while taking this medicine.  You may get drowsy or dizzy. Do not drive, use machinery, or do anything that needs mental alertness until you know how this medicine affects you. Do not sit or stand up quickly, especially if you are an older patient. This reduces the risk of dizzy or fainting spells.  This medicine can make you more sensitive to the sun. Keep out of the sun. If you cannot avoid being in the sun, wear protective clothing and use a sunscreen. Do not use sun lamps or tanning beds/booths. Contact your doctor if you get a sunburn.  If you are a diabetic monitor your blood glucose carefully. If you get an unusual reading stop taking this medicine and call your doctor right away.  Do not treat diarrhea with over-the-counter products. Contact your doctor if you have diarrhea that lasts more than 2 days or if the diarrhea is severe and watery.  Avoid antacids, calcium, iron, and zinc products for 2 hours before and 2 hours after taking a dose of this medicine.  What side effects may I notice from receiving this medicine?  Side effects that you should report to your doctor or health care professional as soon as possible:    allergic reactions like skin rash or hives, swelling of the face, lips, or tongue    changes in vision    confusion, nightmares or hallucinations    difficulty breathing    irregular heartbeat, chest pain    joint, muscle or tendon pain    pain or difficulty passing urine    persistent headache with or without blurred vision    redness, blistering, peeling or loosening of the skin, including inside the mouth    seizures    unusual pain, numbness, tingling, or weakness    vaginal irritation, discharge  Side effects that  usually do not require medical attention (report to your doctor or health care professional if they continue or are bothersome):    diarrhea    dry mouth    headache    stomach upset, nausea    trouble sleeping  This list may not describe all possible side effects. Call your doctor for medical advice about side effects. You may report side effects to FDA at 8-767-FDA-3718.  Where should I keep my medicine?  Keep out of the reach of children.  Store at room temperature between 15 and 30 degrees C (59 and 86 degrees F). Keep in a tightly closed container. Throw away any unused medicine after the expiration date.  NOTE:This sheet is a summary. It may not cover all possible information. If you have questions about this medicine, talk to your doctor, pharmacist, or health care provider. Copyright  2016 [employer]                Patient Education    Prednisone Gastro-resistant tablet    Prednisone Oral solution    Prednisone Oral tablet  Prednisone Oral tablet  What is this medicine?  PREDNISONE (PRED ni sone) is a corticosteroid. It is commonly used to treat inflammation of the skin, joints, lungs, and other organs. Common conditions treated include asthma, allergies, and arthritis. It is also used for other conditions, such as blood disorders and diseases of the adrenal glands.  This medicine may be used for other purposes; ask your health care provider or pharmacist if you have questions.  What should I tell my health care provider before I take this medicine?  They need to know if you have any of these conditions:    Cushing's syndrome    diabetes    glaucoma    heart disease    high blood pressure    infection (especially a virus infection such as chickenpox, cold sores, or herpes)    kidney disease    liver disease    mental illness    myasthenia gravis    osteoporosis    seizures    stomach or intestine problems    thyroid disease    an unusual or allergic reaction to lactose, prednisone, other medicines, foods,  dyes, or preservatives    pregnant or trying to get pregnant    breast-feeding  How should I use this medicine?  Take this medicine by mouth with a glass of water. Follow the directions on the prescription label. Take this medicine with food. If you are taking this medicine once a day, take it in the morning. Do not take more medicine than you are told to take. Do not suddenly stop taking your medicine because you may develop a severe reaction. Your doctor will tell you how much medicine to take. If your doctor wants you to stop the medicine, the dose may be slowly lowered over time to avoid any side effects.  Talk to your pediatrician regarding the use of this medicine in children. Special care may be needed.  Overdosage: If you think you have taken too much of this medicine contact a poison control center or emergency room at once.  NOTE: This medicine is only for you. Do not share this medicine with others.  What if I miss a dose?  If you miss a dose, take it as soon as you can. If it is almost time for your next dose, talk to your doctor or health care professional. You may need to miss a dose or take an extra dose. Do not take double or extra doses without advice.  What may interact with this medicine?  Do not take this medicine with any of the following medications:    metyrapone    mifepristone  This medicine may also interact with the following medications:    aminoglutethimide    amphotericin B    aspirin and aspirin-like medicines    barbiturates    certain medicines for diabetes, like glipizide or glyburide    cholestyramine    cholinesterase inhibitors    cyclosporine    digoxin    diuretics    ephedrine    female hormones, like estrogens and birth control pills    isoniazid    ketoconazole    NSAIDS, medicines for pain and inflammation, like ibuprofen or naproxen    phenytoin    rifampin    toxoids    vaccines    warfarin  This list may not describe all possible interactions. Give your health care  provider a list of all the medicines, herbs, non-prescription drugs, or dietary supplements you use. Also tell them if you smoke, drink alcohol, or use illegal drugs. Some items may interact with your medicine.  What should I watch for while using this medicine?  Visit your doctor or health care professional for regular checks on your progress. If you are taking this medicine over a prolonged period, carry an identification card with your name and address, the type and dose of your medicine, and your doctor's name and address.  This medicine may increase your risk of getting an infection. Tell your doctor or health care professional if you are around anyone with measles or chickenpox, or if you develop sores or blisters that do not heal properly.  If you are going to have surgery, tell your doctor or health care professional that you have taken this medicine within the last twelve months.  Ask your doctor or health care professional about your diet. You may need to lower the amount of salt you eat.  This medicine may affect blood sugar levels. If you have diabetes, check with your doctor or health care professional before you change your diet or the dose of your diabetic medicine.  What side effects may I notice from receiving this medicine?  Side effects that you should report to your doctor or health care professional as soon as possible:    allergic reactions like skin rash, itching or hives, swelling of the face, lips, or tongue    changes in emotions or moods    changes in vision    depressed mood    eye pain    fever or chills, cough, sore throat, pain or difficulty passing urine    increased thirst    swelling of ankles, feet  Side effects that usually do not require medical attention (report to your doctor or health care professional if they continue or are bothersome):    confusion, excitement, restlessness    headache    nausea, vomiting    skin problems, acne, thin and shiny skin    trouble  sleeping    weight gain  This list may not describe all possible side effects. Call your doctor for medical advice about side effects. You may report side effects to FDA at 5-478-IJN-2782.  Where should I keep my medicine?  Keep out of the reach of children.  Store at room temperature between 15 and 30 degrees C (59 and 86 degrees F). Protect from light. Keep container tightly closed. Throw away any unused medicine after the expiration date.  NOTE:This sheet is a summary. It may not cover all possible information. If you have questions about this medicine, talk to your doctor, pharmacist, or health care provider. Copyright  2016 [employer] Standard                Patient Education    Cetirizine Hydrochloride Chewable tablet    Cetirizine Hydrochloride Oral capsule, liquid filled    Cetirizine Hydrochloride Oral disintegrating tablet    Cetirizine Hydrochloride Oral syrup    Cetirizine Hydrochloride Oral tablet  Cetirizine Hydrochloride Oral tablet  What is this medicine?  CETIRIZINE (se TI ra zeen) is an antihistamine. This medicine is used to treat or prevent symptoms of allergies. It is also used to help reduce itchy skin rash and hives.  This medicine may be used for other purposes; ask your health care provider or pharmacist if you have questions.  What should I tell my health care provider before I take this medicine?  They need to know if you have any of these conditions:    kidney disease    liver disease    an unusual or allergic reaction to cetirizine, hydroxyzine, other medicines, foods, dyes, or preservatives    pregnant or trying to get pregnant    breast-feeding  How should I use this medicine?  Take this medicine by mouth with a glass of water. Follow the directions on the prescription label. You can take this medicine with food or on an empty stomach. Take your medicine at regular times. Do not take more often than directed. You may need to take this medicine for several days before your symptoms  improve.  Talk to your pediatrician regarding the use of this medicine in children. Special care may be needed. While this drug may be prescribed for children as young as 6 years of age for selected conditions, precautions do apply.  Overdosage: If you think you have taken too much of this medicine contact a poison control center or emergency room at once.  NOTE: This medicine is only for you. Do not share this medicine with others.  What if I miss a dose?  If you miss a dose, take it as soon as you can. If it is almost time for your next dose, take only that dose. Do not take double or extra doses.  What may interact with this medicine?    alcohol    certain medicines for anxiety or sleep    narcotic medicines for pain    other medicines for colds or allergies  This list may not describe all possible interactions. Give your health care provider a list of all the medicines, herbs, non-prescription drugs, or dietary supplements you use. Also tell them if you smoke, drink alcohol, or use illegal drugs. Some items may interact with your medicine.  What should I watch for while using this medicine?  Visit your doctor or health care professional for regular checks on your health. Tell your doctor if your symptoms do not improve.  You may get drowsy or dizzy. Do not drive, use machinery, or do anything that needs mental alertness until you know how this medicine affects you. Do not stand or sit up quickly, especially if you are an older patient. This reduces the risk of dizzy or fainting spells.   Your mouth may get dry. Chewing sugarless gum or sucking hard candy, and drinking plenty of water may help. Contact your doctor if the problem does not go away or is severe.  What side effects may I notice from receiving this medicine?  Side effects that you should report to your doctor or health care professional as soon as possible:    allergic reactions like skin rash, itching or hives, swelling of the face, lips, or  tongue    changes in vision or hearing    fast or irregular heartbeat    trouble passing urine or change in the amount of urine  Side effects that usually do not require medical attention (report to your doctor or health care professional if they continue or are bothersome):    dizziness    dry mouth    irritability    sore throat    stomach pain    tiredness  This list may not describe all possible side effects. Call your doctor for medical advice about side effects. You may report side effects to FDA at 8-845-TAH-2672.  Where should I keep my medicine?  Keep out of the reach of children.  Store at room temperature between 15 and 30 degrees C (59 and 86 degrees F). Throw away any unused medicine after the expiration date.  NOTE:This sheet is a summary. It may not cover all possible information. If you have questions about this medicine, talk to your doctor, pharmacist, or health care provider. Copyright  2016 Gold Standard                Patient Education    Oseltamivir Phosphate Oral capsule    Oseltamivir Phosphate Oral suspension  Oseltamivir Phosphate Oral capsule  What is this medicine?  OSELTAMIVIR (os el DUMONT i vir) is an antiviral medicine. It is used to prevent and to treat some kinds of influenza or the flu. It will not work for colds or other viral infections.  This medicine may be used for other purposes; ask your health care provider or pharmacist if you have questions.  What should I tell my health care provider before I take this medicine?  They need to know if you have any of the following conditions:    heart disease    immune system problems    kidney disease    liver disease    lung disease    an unusual or allergic reaction to oseltamivir, other medicines, foods, dyes, or preservatives    pregnant or trying to get pregnant    breast-feeding  How should I use this medicine?  Take this medicine by mouth with a glass of water. Follow the directions on the prescription label. Start this medicine at  the first sign of flu symptoms. You can take it with or without food. If it upsets your stomach, take it with food. Take your medicine at regular intervals. Do not take your medicine more often than directed. Take all of your medicine as directed even if you think you are better. Do not skip doses or stop your medicine early.  Talk to your pediatrician regarding the use of this medicine in children. While this drug may be prescribed for children as young as 14 days for selected conditions, precautions do apply.  Overdosage: If you think you have taken too much of this medicine contact a poison control center or emergency room at once.  NOTE: This medicine is only for you. Do not share this medicine with others.  What if I miss a dose?  If you miss a dose, take it as soon as you remember. If it is almost time for your next dose (within 2 hours), take only that dose. Do not take double or extra doses.  What may interact with this medicine?  Interactions are not expected.  This list may not describe all possible interactions. Give your health care provider a list of all the medicines, herbs, non-prescription drugs, or dietary supplements you use. Also tell them if you smoke, drink alcohol, or use illegal drugs. Some items may interact with your medicine.  What should I watch for while using this medicine?  Visit your doctor or health care professional for regular check ups. Tell your doctor if your symptoms do not start to get better or if they get worse.  If you have the flu, you may be at an increased risk of developing seizures, confusion, or abnormal behavior. This occurs early in the illness, and more frequently in children and teens. These events are not common, but may result in accidental injury to the patient. Families and caregivers of patients should watch for signs of unusual behavior and contact a doctor or health care professional right away if the patient shows signs of unusual behavior.  This medicine  is not a substitute for the flu shot. Talk to your doctor each year about an annual flu shot.  What side effects may I notice from receiving this medicine?  Side effects that you should report to your doctor or health care professional as soon as possible:    allergic reactions like skin rash, itching or hives, swelling of the face, lips, or tongue    anxiety, confusion, unusual behavior    breathing problems    hallucination, loss of contact with reality    redness, blistering, peeling or loosening of the skin, including inside the mouth    seizures  Side effects that usually do not require medical attention (report to your doctor or health care professional if they continue or are bothersome):    cough    diarrhea    dizziness    headache    nausea, vomiting    stomach pain  This list may not describe all possible side effects. Call your doctor for medical advice about side effects. You may report side effects to FDA at 3-332-FDA-3478.  Where should I keep my medicine?  Keep out of the reach of children.  Store at room temperature between 15 and 30 degrees C (59 and 86 degrees F). Throw away any unused medicine after the expiration date.  NOTE:This sheet is a summary. It may not cover all possible information. If you have questions about this medicine, talk to your doctor, pharmacist, or health care provider. Copyright  2016 Gold Standard

## 2017-02-15 NOTE — PROVIDER NOTIFICATION
Notified MD at 1230 PM regarding lab results.      Spoke with: DR. Irizarry    Orders were not obtained.    Comments: Treating for sepsis.  Alonzo Vu RN

## 2017-02-15 NOTE — PROGRESS NOTES
S-(situation): Patient arrives to room 253 via cart from [facility] for direct admission.    B-(background): Influenza A; 2 days of coughing, runny nose, and pain with coughing.    A-(assessment): Afebrile. Clear lungs. Denies pain. Alert and oriented. Moist groin folds and edema of 3+ to left foot and ankle from surgery this past fall. IV Vanco infusing.    R-(recommendations): Orders to be written per hospitalist who is on his way in. Will monitor patient per MD orders.     Inpatient nursing criteria listed below were met:    Health care directives status obtained and documented: Yes, has no written directives.  Core Measures assessed (SSI): Yes  SCD's Documented: Yes  Vaccine assessment done and vaccines ordered if appropriate: Yes  Skin issues/needs documented:Yes, moist groin folds  Isolation needs addressed, if appropriate: Yes, droplet  Fall Prevention: Care plan updated, Education given and documented Yes  MRSA swab completed for patient 55 years and older (exclude JEROD and TKA): Yes  My Chart patient sign up addressed and documented: Yes  Care Plan initiated: Yes  Education Assessment documented:Yes  Education Documented (Pre-existing chronic infection such as, MRSA/VRE need education on admission): Yes  New medication patient education completed and documented (Possible Side Effects of Common Medications handout): Yes  Home medications if not able to send immediately home with family stored here: NA   Reminder note placed in discharge instructions: NA  Discharge planning review completed (admission navigator) Yes

## 2017-02-15 NOTE — PLAN OF CARE
Problem: Goal Outcome Summary  Goal: Goal Outcome Summary  Outcome: Therapy, progress toward functional goals as expected  Has been afebrile with clear lungs. Chronic pain in left foot was decreased with Oxycodone. Voiding. Ate well. IV abx have infused. Up with walker and SBA.  Lactic acid before arrival was 4.2. Will continue to monitor labs, vitals, temps, lungs, safety.

## 2017-02-16 ENCOUNTER — APPOINTMENT (OUTPATIENT)
Dept: GENERAL RADIOLOGY | Facility: CLINIC | Age: 62
DRG: 194 | End: 2017-02-16
Attending: FAMILY MEDICINE
Payer: COMMERCIAL

## 2017-02-16 ENCOUNTER — APPOINTMENT (OUTPATIENT)
Dept: PHYSICAL THERAPY | Facility: CLINIC | Age: 62
DRG: 194 | End: 2017-02-16
Attending: FAMILY MEDICINE
Payer: COMMERCIAL

## 2017-02-16 VITALS
SYSTOLIC BLOOD PRESSURE: 148 MMHG | RESPIRATION RATE: 16 BRPM | HEIGHT: 64 IN | OXYGEN SATURATION: 94 % | TEMPERATURE: 97.3 F | DIASTOLIC BLOOD PRESSURE: 77 MMHG | HEART RATE: 77 BPM | BODY MASS INDEX: 35.94 KG/M2 | WEIGHT: 210.54 LBS

## 2017-02-16 LAB
ALBUMIN SERPL-MCNC: 2.9 G/DL (ref 3.4–5)
ALP SERPL-CCNC: 73 U/L (ref 40–150)
ALT SERPL W P-5'-P-CCNC: 19 U/L (ref 0–50)
ANION GAP SERPL CALCULATED.3IONS-SCNC: 10 MMOL/L (ref 3–14)
AST SERPL W P-5'-P-CCNC: 12 U/L (ref 0–45)
BACTERIA SPEC CULT: NORMAL
BASOPHILS # BLD AUTO: 0 10E9/L (ref 0–0.2)
BASOPHILS NFR BLD AUTO: 0.1 %
BILIRUB SERPL-MCNC: 0.4 MG/DL (ref 0.2–1.3)
BUN SERPL-MCNC: 16 MG/DL (ref 7–30)
CALCIUM SERPL-MCNC: 8.7 MG/DL (ref 8.5–10.1)
CHLORIDE SERPL-SCNC: 103 MMOL/L (ref 94–109)
CO2 SERPL-SCNC: 27 MMOL/L (ref 20–32)
CREAT SERPL-MCNC: 0.7 MG/DL (ref 0.52–1.04)
DIFFERENTIAL METHOD BLD: ABNORMAL
EOSINOPHIL # BLD AUTO: 0 10E9/L (ref 0–0.7)
EOSINOPHIL NFR BLD AUTO: 0 %
ERYTHROCYTE [DISTWIDTH] IN BLOOD BY AUTOMATED COUNT: 13.1 % (ref 10–15)
GFR SERPL CREATININE-BSD FRML MDRD: 85 ML/MIN/1.7M2
GLUCOSE BLDC GLUCOMTR-MCNC: 174 MG/DL (ref 70–99)
GLUCOSE BLDC GLUCOMTR-MCNC: 219 MG/DL (ref 70–99)
GLUCOSE BLDC GLUCOMTR-MCNC: 256 MG/DL (ref 70–99)
GLUCOSE SERPL-MCNC: 245 MG/DL (ref 70–99)
HCT VFR BLD AUTO: 31.6 % (ref 35–47)
HGB BLD-MCNC: 10.5 G/DL (ref 11.7–15.7)
IMM GRANULOCYTES # BLD: 0 10E9/L (ref 0–0.4)
IMM GRANULOCYTES NFR BLD: 0.3 %
LACTATE BLD-SCNC: 1.8 MMOL/L (ref 0.7–2.1)
LYMPHOCYTES # BLD AUTO: 1.9 10E9/L (ref 0.8–5.3)
LYMPHOCYTES NFR BLD AUTO: 25 %
MCH RBC QN AUTO: 30.2 PG (ref 26.5–33)
MCHC RBC AUTO-ENTMCNC: 33.2 G/DL (ref 31.5–36.5)
MCV RBC AUTO: 91 FL (ref 78–100)
MICRO REPORT STATUS: NORMAL
MONOCYTES # BLD AUTO: 0.6 10E9/L (ref 0–1.3)
MONOCYTES NFR BLD AUTO: 8.4 %
NEUTROPHILS # BLD AUTO: 5 10E9/L (ref 1.6–8.3)
NEUTROPHILS NFR BLD AUTO: 66.2 %
PLATELET # BLD AUTO: 205 10E9/L (ref 150–450)
POTASSIUM SERPL-SCNC: 4.1 MMOL/L (ref 3.4–5.3)
PROT SERPL-MCNC: 7 G/DL (ref 6.8–8.8)
RBC # BLD AUTO: 3.48 10E12/L (ref 3.8–5.2)
SODIUM SERPL-SCNC: 140 MMOL/L (ref 133–144)
SPECIMEN SOURCE: NORMAL
WBC # BLD AUTO: 7.6 10E9/L (ref 4–11)

## 2017-02-16 PROCEDURE — 85025 COMPLETE CBC W/AUTO DIFF WBC: CPT | Performed by: FAMILY MEDICINE

## 2017-02-16 PROCEDURE — 00000146 ZZHCL STATISTIC GLUCOSE BY METER IP

## 2017-02-16 PROCEDURE — 97116 GAIT TRAINING THERAPY: CPT | Mod: GP

## 2017-02-16 PROCEDURE — 25000125 ZZHC RX 250: Performed by: FAMILY MEDICINE

## 2017-02-16 PROCEDURE — 99238 HOSP IP/OBS DSCHRG MGMT 30/<: CPT | Performed by: FAMILY MEDICINE

## 2017-02-16 PROCEDURE — 40000193 ZZH STATISTIC PT WARD VISIT

## 2017-02-16 PROCEDURE — 80053 COMPREHEN METABOLIC PANEL: CPT | Performed by: FAMILY MEDICINE

## 2017-02-16 PROCEDURE — 83605 ASSAY OF LACTIC ACID: CPT | Performed by: FAMILY MEDICINE

## 2017-02-16 PROCEDURE — 71020 XR CHEST 2 VW: CPT | Mod: TC

## 2017-02-16 PROCEDURE — 25000128 H RX IP 250 OP 636: Performed by: FAMILY MEDICINE

## 2017-02-16 PROCEDURE — 36415 COLL VENOUS BLD VENIPUNCTURE: CPT | Performed by: FAMILY MEDICINE

## 2017-02-16 PROCEDURE — 25000131 ZZH RX MED GY IP 250 OP 636 PS 637: Performed by: FAMILY MEDICINE

## 2017-02-16 PROCEDURE — 25000132 ZZH RX MED GY IP 250 OP 250 PS 637: Performed by: FAMILY MEDICINE

## 2017-02-16 RX ORDER — OSELTAMIVIR PHOSPHATE 75 MG/1
75 CAPSULE ORAL 2 TIMES DAILY
Qty: 10 CAPSULE | Refills: 0 | Status: SHIPPED | OUTPATIENT
Start: 2017-02-16 | End: 2019-04-06

## 2017-02-16 RX ORDER — LEVOFLOXACIN 750 MG/1
750 TABLET, FILM COATED ORAL DAILY
Qty: 5 TABLET | Refills: 0 | Status: SHIPPED | OUTPATIENT
Start: 2017-02-16 | End: 2019-04-06

## 2017-02-16 RX ORDER — OXYCODONE HYDROCHLORIDE 5 MG/1
5-10 TABLET ORAL EVERY 6 HOURS PRN
Status: DISCONTINUED | OUTPATIENT
Start: 2017-02-16 | End: 2017-02-16 | Stop reason: HOSPADM

## 2017-02-16 RX ORDER — PREDNISONE 20 MG/1
60 TABLET ORAL DAILY
Qty: 12 TABLET | Refills: 0 | Status: SHIPPED | OUTPATIENT
Start: 2017-02-16 | End: 2017-02-20

## 2017-02-16 RX ORDER — CETIRIZINE HYDROCHLORIDE 10 MG/1
10 TABLET ORAL DAILY
Qty: 30 TABLET | Refills: 0 | Status: SHIPPED | OUTPATIENT
Start: 2017-02-16

## 2017-02-16 RX ADMIN — LISINOPRIL 20 MG: 10 TABLET ORAL at 09:04

## 2017-02-16 RX ADMIN — TAZOBACTAM SODIUM AND PIPERACILLIN SODIUM 4.5 G: 500; 4 INJECTION, SOLUTION INTRAVENOUS at 11:02

## 2017-02-16 RX ADMIN — LAMOTRIGINE 100 MG: 100 TABLET ORAL at 09:05

## 2017-02-16 RX ADMIN — POLYETHYLENE GLYCOL 3350 17 G: 17 POWDER, FOR SOLUTION ORAL at 09:03

## 2017-02-16 RX ADMIN — ATENOLOL 50 MG: 50 TABLET ORAL at 09:03

## 2017-02-16 RX ADMIN — TAZOBACTAM SODIUM AND PIPERACILLIN SODIUM 4.5 G: 500; 4 INJECTION, SOLUTION INTRAVENOUS at 04:30

## 2017-02-16 RX ADMIN — OXYCODONE HYDROCHLORIDE 10 MG: 5 TABLET ORAL at 04:54

## 2017-02-16 RX ADMIN — AMLODIPINE BESYLATE 10 MG: 10 TABLET ORAL at 17:29

## 2017-02-16 RX ADMIN — ASPIRIN 81 MG: 81 TABLET, COATED ORAL at 09:05

## 2017-02-16 RX ADMIN — FLUTICASONE PROPIONATE 1 PUFF: 250 POWDER, METERED RESPIRATORY (INHALATION) at 09:19

## 2017-02-16 RX ADMIN — PREDNISONE 60 MG: 20 TABLET ORAL at 09:04

## 2017-02-16 RX ADMIN — CETIRIZINE HYDROCHLORIDE 10 MG: 10 TABLET, FILM COATED ORAL at 09:05

## 2017-02-16 RX ADMIN — FLUTICASONE PROPIONATE 1 PUFF: 250 POWDER, METERED RESPIRATORY (INHALATION) at 15:54

## 2017-02-16 RX ADMIN — LINAGLIPTIN 5 MG: 5 TABLET, FILM COATED ORAL at 11:01

## 2017-02-16 RX ADMIN — LEVOFLOXACIN 750 MG: 5 INJECTION, SOLUTION INTRAVENOUS at 09:19

## 2017-02-16 RX ADMIN — INSULIN GLARGINE 60 UNITS: 300 INJECTION, SOLUTION SUBCUTANEOUS at 07:46

## 2017-02-16 RX ADMIN — METFORMIN HYDROCHLORIDE 1000 MG: 500 TABLET ORAL at 09:04

## 2017-02-16 RX ADMIN — SIMVASTATIN 10 MG: 5 TABLET, FILM COATED ORAL at 09:05

## 2017-02-16 RX ADMIN — OSELTAMIVIR PHOSPHATE 75 MG: 75 CAPSULE ORAL at 09:03

## 2017-02-16 RX ADMIN — OMEPRAZOLE 20 MG: 20 CAPSULE, DELAYED RELEASE ORAL at 09:03

## 2017-02-16 RX ADMIN — CITALOPRAM HYDROBROMIDE 40 MG: 20 TABLET ORAL at 09:04

## 2017-02-16 RX ADMIN — INSULIN ASPART 15 UNITS: 100 INJECTION, SOLUTION INTRAVENOUS; SUBCUTANEOUS at 11:40

## 2017-02-16 RX ADMIN — INSULIN ASPART 15 UNITS: 100 INJECTION, SOLUTION INTRAVENOUS; SUBCUTANEOUS at 07:48

## 2017-02-16 RX ADMIN — FUROSEMIDE 20 MG: 20 TABLET ORAL at 09:05

## 2017-02-16 RX ADMIN — INSULIN ASPART 15 UNITS: 100 INJECTION, SOLUTION INTRAVENOUS; SUBCUTANEOUS at 17:30

## 2017-02-16 RX ADMIN — METFORMIN HYDROCHLORIDE 1000 MG: 500 TABLET ORAL at 17:30

## 2017-02-16 NOTE — PROGRESS NOTES
02/15/17 1344   Quick Adds   Type of Visit Initial PT Evaluation   Living Environment   Lives With alone   Living Arrangements apartment   Home Accessibility stairs to enter home   Number of Stairs to Enter Home 15   Number of Stairs Within Home 0   Stair Railings at Home inside, present at both sides   Transportation Available car;other (see comments)  (PCAs)   Living Environment Comment Pt lives alone in an apartment with 15 steps to get to it. She was receiving home PCA services, and home PT 2x per week, as she had been home for only 3 weeks. She was at a TCU for 3 months after reconstructive foot surgery for a collapsed arch (was NWB, now is WBAT). Once inside her apartment, she doesn't have anymore stairs. She recently started progress out of the wheelchair 2 weeks ago. She uses a wheelchair for much of home mobility, and cannot take the wheelchair all the way into the bathroom due to size, but has been able to use the walker lately. She has a tub/shower which she has been using independently.    Self-Care   Usual Activity Tolerance fair   Current Activity Tolerance fair   Regular Exercise yes   Activity/Exercise Type walking;other (see comments)   Exercise Amount/Frequency 30 mins;2 times/wk   Equipment Currently Used at Home wheelchair;walker, rolling   Activity/Exercise/Self-Care Comment Pt has PT come 2x per week to do ankle rehab and gait training.    Functional Level Prior   Ambulation 3-->assistive equipment and person   Transferring 3-->assistive equipment and person   Toileting 2-->assistive person   Bathing 0-->independent   Dressing 0-->independent   Eating 0-->independent   Communication 0-->understands/communicates without difficulty   Swallowing 0-->swallows foods/liquids without difficulty   Cognition 0 - no cognition issues reported   Fall history within last six months no   Which of the above functional risks had a recent onset or change? none   Prior Functional Level Comment Pt was primarily  using a wheelchair at baseline. She also has a 2WW at home. She was wearing what is described as a CAM boot for ambulating. PCAs help with making meals, walking dog and laundry.    General Information   Onset of Illness/Injury or Date of Surgery - Date 02/15/17   Referring Physician Dr Irizarry   Patient/Family Goals Statement Pt wants to go home, wants to walk without a walker again someday   Pertinent History of Current Problem (include personal factors and/or comorbidities that impact the POC) Ms Walker is a pleasant 60 y/o female transferred to [facility] from [facility] due to their hospital being full, after she was evaluated in their ED and tested positive for influenza A, and is showing possible signs of sepsis. She lives alone in an apartment with 15 stairs to enter. Recently, she was at [facility] in [place], [place] for rehab after having reconstructive foot surgery for what sound like collapsed arches which were causing her significant pain and dysfunction in daily life. She was there for over 3 months, and was NWB on her L LE. Per pt, she no longer has restrictions for WB on her L LE and she was discharged to home with home PCA and HOme PT services 2x per week to reintroduce gait, though she continues to be primarily wheelchair mobile. PT jacob ordered to assess for safety with gait, and make recommendations for home discharge with regards to safety, as well as continuing her ankle rehab during her stay.    Precautions/Limitations fall precautions   Weight-Bearing Status - LUE full weight-bearing   Weight-Bearing Status - RUE full weight-bearing   Weight-Bearing Status - LLE weight-bearing as tolerated   Weight-Bearing Status - RLE full weight-bearing   Heart Disease Risk Factors Age;Overweight;Diabetes;Lack of physical activity   General Observations Moving Ad sarah in bed without issues   Cognitive Status Examination   Orientation orientation to person, place and time   Level of Consciousness alert    Follows Commands and Answers Questions 100% of the time   Personal Safety and Judgment intact   Memory intact   Pain Assessment   Patient Currently in Pain Yes, see Vital Sign flowsheet   Integumentary/Edema   Integumentary/Edema Comments Moderate swelling noted L foot, old incisions appear CDI. Left foot with 2+ pitting edema present.    Posture    Posture Forward head position;Protracted shoulders;Kyphosis   Range of Motion (ROM)   ROM Comment UEs and R LE WFL for functional mobility, L LE limited at ankle due to post-op joint stiffness/swelling   Strength   Strength Comments UEs and R LE WFL for functional mobility, L LE limited at ankle due to post-op joint stiffness/swelling   Bed Mobility   Bed Mobility Comments Performs rolling, bridging, boosting appropriately on command without assistance or cueing   Transfer Skills   Transfer Comments Supine <> sit and sit <> stand transfers performed in bed with bed flat without bed rails to simulate home environment. Uses front wheeled walker appropriately.    Gait   Gait Comments Ambualtes with use of front wheeled walker with SBA in room, including bathroom mobility with assist to manage IV line/tower.    Balance   Balance Comments Sitting balance good, able to reach and manage internal perturbations without issue. Standing balance with mild deficits, due to decreased L ankle ROM/strength, balance appropriate with use of front wheeled walker.    Sensory Examination   Sensory Perception no deficits were identified   Coordination   Coordination no deficits were identified   Muscle Tone   Muscle Tone no deficits were identified   Modality Interventions   Planned Modality Interventions Comments prn for pain/swelling   General Therapy Interventions   Planned Therapy Interventions balance training;bed mobility training;gait training;manual therapy;neuromuscular re-education;ROM;strengthening;stretching;transfer training;risk factor education;home program  "guidelines;progressive activity/exercise   Intervention Comments Plan to use the above interventions to progress safety and independence with functional mobility and ambulation prior to discharge   Clinical Impression   Criteria for Skilled Therapeutic Intervention yes, treatment indicated   PT Diagnosis Imbalance, Gait abnormality, s/p Left foot surgery (3+ months ago)   Influenced by the following impairments Decreased strength, balance, AROM/PROM   Functional limitations due to impairments Impaired gait, Impaired stair mobility, Impaired balance   Clinical Presentation Stable/Uncomplicated   Clinical Presentation Rationale Appears near baseline   Clinical Decision Making (Complexity) Low complexity   Therapy Frequency` daily   Predicted Duration of Therapy Intervention (days/wks) 1 day   Anticipated Discharge Disposition Home with Home Therapy   Risk & Benefits of therapy have been explained Yes   Patient, Family & other staff in agreement with plan of care Yes   Clinical Impression Comments Daly is a pleasant 62 y/o female admitted to the hospital with influenza A, SOB consistent with possible COPD exacerbation, with signs of possible sepsis. She had foot surgery approximately 4 months ago, and was in rehab for 3.5 months while she was NWB. Currently, per pt report, she is WBAT and is doing quite well overall. She appears to be ambulating near baseline and would likely be safe to return home from a mobility perspective, with continuing the previous Home PT and PCA services she was receiving. She will benefit from continued skilled therapy in the hospital to facilitate a safe discharge home.    [facility] AM-PAC TM \"6 Clicks\"   2016, Trustees of [facility], under license to [employer].  All rights reserved.   6 Clicks Short Forms Basic Mobility Inpatient Short Form   [facility] AM-PAC  \"6 Clicks\" V.2 Basic Mobility Inpatient Short Form   1. Turning from your back to your side while in a " flat bed without using bedrails? 4 - None   2. Moving from lying on your back to sitting on the side of a flat bed without using bedrails? 4 - None   3. Moving to and from a bed to a chair (including a wheelchair)? 4 - None   4. Standing up from a chair using your arms (e.g., wheelchair, or bedside chair)? 4 - None   5. To walk in hospital room? 3 - A Little   6. Climbing 3-5 steps with a railing? 3 - A Little   Basic Mobility Raw Score (Score out of 24.Lower scores equate to lower levels of function) 22   Total Evaluation Time   Total Evaluation Time (Minutes) 20           Freddie Bolivar PT, DPT        2/15/2017      6:20 PM  [facility]  [phone]

## 2017-02-16 NOTE — PLAN OF CARE
Problem: Sepsis (Adult)  Goal: Signs and Symptoms of Listed Potential Problems Will be Absent or Manageable (Sepsis)  Signs and symptoms of listed potential problems will be absent or manageable by discharge/transition of care (reference Sepsis (Adult) CPG).   Outcome: Improving  Note that pt is eating well, VSS, up and about in room and walking in the hallway.  Pt with minimal c/o's.

## 2017-02-16 NOTE — PLAN OF CARE
"Problem: Goal Outcome Summary  Goal: Goal Outcome Summary     Goal status: Progressing well     Functional status: Pt doing well today, however she feels \"run down\". Demonstrates bed mobility and supine <> sit transfers with HOB flat with Mod I, no cues given for success, simulating home environment. Sit <> stand transfers with Mod I demonstrated, no AD used, with fair standing balance without AD. Ambulates x300 ft with front wheeled walker with verbal cues for gait kinematics, appropriately and ascends/descends 10 stairs with bilateral railings demonstrating step-to gait pattern with cueing, stand-by assist given, to simulate home environment. Pt states she never uses the stairs alone, and plans to resume her home PT that she was previously getting from [facility].     Areas of progress: Gait distance, Stairs, independence with ankle AROM    Barrier to discharge Home: None     Equipment needs: None anticipated     Discharge disposition/rationale: Home with resuming previous services she was receiving, including home PT     Transport recommendations: Private transport, states a friend can pick her up     Will discharge pt from IP PT, as she is moving at baseline level and appears safe for discharge. Please contact PT if a change in mobility status should occur or if further needs are identified.              Physical Therapy Discharge Summary     Reason for therapy discharge:    All goals and outcomes met, no further needs identified.     Progress towards therapy goal(s). See goals on Care Plan in [facility] electronic health record for goal details.  Goals met     Therapy recommendation(s):    Continued therapy is recommended.  Rationale/Recommendations:  Pt will benefit from continued skilled therapy with Home PT services for gait training and home safety, likely followed by OP PT services after she is safe to drive a vehicle to restore functional abilities associated with previous Left foot surgery.         "   Freddie Bolivar, PT, DPT        2/16/2017      9:26 AM  [facility]  [phone]

## 2017-02-16 NOTE — PLAN OF CARE
Problem: Goal Outcome Summary  Goal: Goal Outcome Summary  Outcome: Improving  RA O2 sats 95%. Lungs are clear. Pt requested Oxycodone for L foot pain after being up to the bathroom. Afebrile. Will continue to monitor per plan of care.

## 2017-02-16 NOTE — PLAN OF CARE
Problem: Goal Outcome Summary  Goal: Goal Outcome Summary  Outcome: Completed Date Met:  02/16/17  Afebrile with clear lungs. Showered. No shortness of breath. Lactic acid was 1.8 today. Sats 93-94% on room air. Tolerating regular diet and voiding. Will discharge to home today.

## 2017-02-16 NOTE — DISCHARGE SUMMARY
[facility]    Discharge Summary  Hospitalist    Date of Admission:  2/15/2017  Date of Discharge:  2/16/2017  Discharging Provider: Margo Méndez Mai  Date of Service (when I saw the patient): 02/16/17    Discharge Diagnoses   Elevated lactic acid  Influenza A  COPD exacerbation  High blood pressure  Diabetes type 2  Ankle pain with recent history of ankle reconstruction  Seizure disorder    History of Present Illness     Daly Walker is a 61 year old female who presented to the emergency room at [facility] earlier this morning for coughing, fevers, sore throat and shortness of breath. She is known to have COPD which normally controlled with Qvar and when necessary inhalers. She was doing well until 2 days ago, when she started having the runny nose, nasal congestion with body ache. She also has the fever. Denies of headache or dizziness. He was coughing which was nonproductive but congested. She was wheezing and feels shortness of breath. She was dyspneic as well. No chest tightness sensation. No leg swelling and denies orthopnea. She was using the nebulizer at home but didn't seem to help much and therefore she went to the emergency room. She was eating and drinking well, no nausea or vomiting. No diarrhea or constipation and denies of urinary symptoms. No leg swelling. No exposure to any acute illnesses. She does not smoke and there was no recent history of long distance traveling. He just got home about 3 weeks ago from the rehabilitation where she stayed for 3.5 months. She had a foot surgery/reconstruction with no complication    Hospital Course   Daly Walker was admitted on 2/15/2017.  The following problems were addressed during her hospitalization:    1. Elevated lactic acid  She presented with cough and fever and workup in the ER showed influenza A. She also displayed symptoms of COPD exacerbation. Her lactic acid level is elevated. He is not tachycardic and her O2  sat has been stable and normal. She was in a rehabilitation facility about 3.5 months and was discharged to home about 3 weeks ago.  Workup included chest x-ray and UA were normal. CBC showed no leukocytosis. She was admitted to the hospital for aggressive monitoring and management. Vital signs are stable. Blood culture were negative ×2 so far. She was started on antibiotic when she was in the ER. She was treated with Zosyn and Levaquin. Monitored the lactic acid level closely and it was normal at the time of discharge.  She will discharge home with Levaquin     2.  Influenza A  She did not received the flu vaccination this year. She did not want any vaccination for now. No respiratory distress. She was treated with Tamiflu.  Her flu symptoms resolved at the time of discharge     3.  COPD exacerbation (H)  She was wheezing and was having some shortness of breath went to work in the ER. She was given a dose of solumedrol. She in feeling better. Her O2 sat above 92% at room air throughout her hospitalization. Chest no chest pain or shortness of breath. She is not in an acute respiratory distress. Her chest x-ray was normal.  She is being treated for sepsis with Levaquin and Zosyn. She was in rehabilitation facility for 3.5 months before she would discharge for 3 weeks ago. Was started on nebs as needed. Also treated with Prednisone. Oxygen as needed to maintain O2 sat around 93-94%. He has no problem with breathing at that time of discharge.     4. Essential hypertension, benign   Blood pressure was high in the ER. It is trending down and was normal during the hospitalization. She is on multiple medications for this.  Continued with her outpatient medications which include Norvasc, atenolol and lisinopril.      5. TYPE 2 DIABETES, HBA1C GOAL < 7%  States that her diabetes has been controlled at home. He is on multiple medications for this. She does not want to be on diabetic diet and stated that she will not eat if  she is put on diabetic diet.  Continue with her outpatient medications which include the metformin, glargine, exenatide and NovoLog when necessary. As per her request, regular diet was ordered. Monitor her blood sugar closely.  Her blood sugar was stable throughout the hospitalization in the 200s.     6. Ankle pain  She had the ankle surgery recently. She was in rehabilitation for 3-1/2 month and will discharge home about 3 weeks ago. She has home physical therapy about 2 times a week. She feels unstable and use the walker when she ambulates. She is taking oxycodone as needed for pain.  Fall prevention discussed and recommend her to use a walker with ambulation. Physical therapy will consult for further evaluation and management. Continue with the oxycodone as needed for pain. Resume home physical therapy      7. Seizure disorder (H)  Last seizure episode was years ago. She is on Lamictal. Continue with the Lamictal. Seizure precautions discussed.     At the time of discharge, patient stated that she is feeling great. She was pretty much back to her normal self.  Denies of headache or dizziness. No runny nose, nasal congestion. No sinus pressure or pain. No coughing, chest pain or shortness of breath.  Normal appetite and tolerated by mouth intake well. She feel comfortable to go home. Pain was minimal.    Margo Méndez Mai    Significant Results and Procedures   None    Pending Results   These results will be followed up by [facility]  Unresulted Labs Ordered in the Past 30 Days of this Admission     Date and Time Order Name Status Description    2/15/2017 0949 Blood culture Preliminary     2/15/2017 0949 Blood culture Preliminary           Code Status   Full Code       Primary Care Physician   Jamey Chase    Physical Exam   Temp: 97.3  F (36.3  C) Temp src: Oral BP: 148/77 Pulse: 77   Resp: 16 SpO2: 94 % O2 Device: None (Room air)    Vitals:    02/15/17 0725 02/16/17 0428   Weight: 95.5 kg (210 lb 8.6 oz) 95.5 kg  (210 lb 8.6 oz)     Vital Signs with Ranges  Temp:  [96.2  F (35.7  C)-97.6  F (36.4  C)] 97.3  F (36.3  C)  Pulse:  [70-77] 77  Resp:  [16-20] 16  BP: (134-154)/(70-77) 148/77  SpO2:  [93 %-96 %] 94 %  I/O last 3 completed shifts:  In: 1100 [P.O.:900; I.V.:200]  Out: 700 [Urine:700]    Constitutional: awake, alert, cooperative, no apparent distress, and appears stated age  Respiratory: Clear, no wheezes or crackle. Good respiratory effort throughout.  Cardiovascular: Regular rate and rhythm no murmur  Musculoskeletal: No peripheral edema    Discharge Disposition   Discharged to home  Condition at discharge: Stable    Consultations This Hospital Stay   PHYSICAL THERAPY ADULT IP CONSULT  SOCIAL WORK IP CONSULT    Time Spent on this Encounter   IMargo Mai, personally saw the patient today and spent less than or equal to 30 minutes discharging this patient.    Discharge Orders     Home Care PT Referral for Hospital Discharge     Reason for your hospital stay   Daly was admitted for Influenza A infection with COPD exacerbation and elevated lactic acid level - concerning of sepsis.     Follow-up and recommended labs and tests    Follow up with primary care provider, Jamey Chase, within 7 days for hospital follow- up.  No follow up labs or test are needed.     Activity   Your activity upon discharge: activity as tolerated with walker.     When to contact your care team   Call your primary doctor if you have any of the following: temperature greater than 101 or less than 95 increased shortness of breath or increased swelling.     Discharge Instructions     MD face to face encounter   Documentation of Face to Face and Certification for Home Health Services    I certify that patient: Daly Walker is under my care and that I, or a nurse practitioner or physician's assistant working with me, had a face-to-face encounter that meets the physician face-to-face encounter requirements with this patient on:  2/16/2017.    This encounter with the patient was in whole, or in part, for the following medical condition, which is the primary reason for home health care: Continue with home physical therapy for her feet.    I certify that, based on my findings, the following services are medically necessary home health services: Physical Therapy.    My clinical findings support the need for the above services because: Physical Therapy Services are needed to assess and treat the following functional impairments: recent foot surgery - continue her PT.    Further, I certify that my clinical findings support that this patient is homebound (i.e. absences from home require considerable and taxing effort and are for medical reasons or Christian services or infrequently or of short duration when for other reasons) because: Leaving home is medically contraindicated for the following reason(s): difficult to get in and out of the car.    Based on the above findings. I certify that this patient is confined to the home and needs intermittent skilled nursing care, physical therapy and/or speech therapy.  The patient is under my care, and I have initiated the establishment of the plan of care.  This patient will be followed by a physician who will periodically review the plan of care.  Physician/Provider to provide follow up care: Jamey Chase    Attending hospital physician (the Medicare certified [place] provider): Margo Méndez Mai  Physician Signature: See electronic signature associated with these discharge orders.  Date: 2/16/2017     Full Code     Diet   Follow this diet upon discharge: Orders Placed This Encounter     Advance Diet as Tolerated: Diabetic Diet Adult       Discharge Medications   Current Discharge Medication List      START taking these medications    Details   predniSONE (DELTASONE) 20 MG tablet Take 3 tablets (60 mg) by mouth daily for 4 days  Qty: 12 tablet, Refills: 0    Associated Diagnoses: Obstructive chronic bronchitis  with exacerbation (H)      cetirizine (ZYRTEC) 10 MG tablet Take 1 tablet (10 mg) by mouth daily  Qty: 30 tablet, Refills: 0    Associated Diagnoses: Obstructive chronic bronchitis with exacerbation (H)      oseltamivir (TAMIFLU) 75 MG capsule Take 1 capsule (75 mg) by mouth 2 times daily  Qty: 10 capsule, Refills: 0    Associated Diagnoses: Influenza A      levofloxacin (LEVAQUIN) 750 MG tablet Take 1 tablet (750 mg) by mouth daily  Qty: 5 tablet, Refills: 0    Associated Diagnoses: Obstructive chronic bronchitis with exacerbation (H)         CONTINUE these medications which have NOT CHANGED    Details   albuterol (PROAIR HFA/PROVENTIL HFA/VENTOLIN HFA) 108 (90 BASE) MCG/ACT Inhaler Inhale 2 puffs into the lungs every 4 hours as needed      amLODIPine (NORVASC) 10 MG tablet Take 10 mg by mouth daily (with dinner)      beclomethasone (QVAR) 80 MCG/ACT Inhaler Inhale 1 puff into the lungs 2 times daily      calcium carb 1250 mg, 500 mg [place],/vitamin D 200 units (OSCAL WITH D) 500-200 MG-UNIT per tablet Take 1 tablet by mouth daily (with dinner)      citalopram (CELEXA) 40 MG tablet Take 40 mg by mouth daily      exenatide ER (BYDUREON) 2 MG pen Inject 2 mg Subcutaneous Every Tuesday      furosemide (LASIX) 20 MG tablet Take 20 mg by mouth every morning      lamoTRIgine (LAMICTAL) 100 MG tablet Take 100 mg by mouth 2 times daily      linagliptin (TRADJENTA) 5 MG TABS tablet Take 5 mg by mouth daily      omeprazole (PRILOSEC) 20 MG CR capsule Take 20 mg by mouth daily      oxyCODONE (ROXICODONE) 5 MG IR tablet Take 1-2 tablets by mouth 2 times daily as needed      senna-docusate (SENOKOT-S;PERICOLACE) 8.6-50 MG per tablet Take 1 tablet by mouth 2 times daily as needed      albuterol (2.5 MG/3ML) 0.083% neb solution Inhale 2.5 mg into the lungs every 6 hours as needed      atenolol (TENORMIN) 50 MG tablet Take 50 mg by mouth daily      insulin aspart (NOVOLOG PEN) 100 UNIT/ML injection Inject 15 Units Subcutaneous       insulin glargine U-300 (TOUJEO) 300 UNIT/ML injection Inject 60 Units Subcutaneous every morning (before breakfast)      lisinopril (PRINIVIL/ZESTRIL) 20 MG tablet Take 20 mg by mouth daily      lovastatin (MEVACOR) 20 MG tablet Take 20 mg by mouth daily      metFORMIN (GLUCOPHAGE) 1000 MG tablet Take 1,000 mg by mouth 2 times daily (with meals)      polyethylene glycol (MIRALAX/GLYCOLAX) powder Take 17 g by mouth daily      aspirin EC 81 MG EC tablet Take 81 mg by mouth daily           Allergies   No Known Allergies  Data   Most Recent 3 CBC's:  Recent Labs   Lab Test  02/16/17   0556  02/15/17   1210  03/12/10   0637   WBC  7.6  4.6  10.2   HGB  10.5*  11.2*  12.5   MCV  91  91  92   PLT  205  216  208      Most Recent 3 BMP's:  Recent Labs   Lab Test  02/16/17   0556  02/15/17   1210  08/27/10   1057   NA  140  137  136   POTASSIUM  4.1  4.3  4.5   CHLORIDE  103  102  99   CO2  27  23  27   BUN  16  12  12   CR  0.70  0.64  0.59   ANIONGAP  10  12  10.5   GUZMAN  8.7  8.4*  10.2   GLC  245*  383*  289*     Most Recent 2 LFT's:  Recent Labs   Lab Test  02/16/17   0556  02/15/17   1210   AST  12  18   ALT  19  24   ALKPHOS  73  79   BILITOTAL  0.4  0.4     Most Recent INR's and Anticoagulation Dosing History:  Anticoagulation Dose History     There is no flowsheet data to display.        Most Recent 3 Troponin's:No lab results found.  Most Recent Cholesterol Panel:  Recent Labs   Lab Test  12/23/10   1221   CHOL  213*   LDL  121   HDL  49*   TRIG  215*     Most Recent 6 Bacteria Isolates From Any Culture (See EPIC Reports for Culture Details):  Recent Labs   Lab Test  02/15/17   1223  02/15/17   1210  02/15/17   1000   CULT  No growth after 20 hours  No growth after 20 hours  No MRSA isolated     Most Recent TSH, T4 and A1c Labs:  Recent Labs   Lab Test  12/23/10   1221   TSH  0.69   A1C  9.1*     Results for orders placed or performed during the hospital encounter of 02/15/17   X-ray Chest 2 vws*    Narrative     CHEST TWO VIEWS February 16, 2017 7:19 AM     HISTORY: Chronic obstructive pulmonary disease exacerbation.    COMPARISON: Chest x-ray dated 1/9/2010.    FINDINGS: The lungs are grossly clear. Cardiac silhouette appears  mildly enlarged. Mediastinum and pulmonary vascularity are within  normal limits. No pneumothorax or significant pleural fluid  collection.      Impression    IMPRESSION: Cardiac silhouette appears mildly enlarged. No other  evidence of acute cardiopulmonary disease is seen.

## 2017-02-17 NOTE — PROGRESS NOTES
S-(situation): Patient discharged to home via wheelchair with friend.    B-(background): Influenza A    A-(assessment): Lungs clear with room air sats of 93-94%. Afebrile. Chronic left ankle pain controlled with Oxycodone. Lactic acid was 1.8 today. Blood sugars were 219, 174, and 256. Up with walker and SBA. Tolerated regular diet. Voided and showered without difficulty.     R-(recommendations): Discharge instructions reviewed with pt. Listed belongings gathered and returned to patient. Will follow up with her primary physician in [place] next week.        Discharge Nursing Criteria:     Care Plan and Patient education resolved: Yes    New Medications- pt has been educated about purpose and side effects: Yes    Vaccines  Pneumonia Vaccine verified at discharge: Yes, refused  Influenza status verified at discharge:  Yes, refused        MISC  Prescriptions if needed, hard copies sent with patient  Yes  Home and hospital aquired medications returned to patient: Yes  Medication Bin checked and emptied on discharge Yes  Patient reports post-discharge pain management plan is effective: Yes

## 2017-02-17 NOTE — PLAN OF CARE
received a call from [facility] this morning.  This patient is their current client. Writer has faxed the PT orders for home care and the d/c summary, per request of home care.

## 2017-02-21 LAB
BACTERIA SPEC CULT: NORMAL
BACTERIA SPEC CULT: NORMAL
Lab: 3392
Lab: 3392
MICRO REPORT STATUS: NORMAL
MICRO REPORT STATUS: NORMAL
SPECIMEN SOURCE: NORMAL
SPECIMEN SOURCE: NORMAL

## 2019-03-04 ENCOUNTER — HOSPITAL ENCOUNTER (EMERGENCY)
Facility: CLINIC | Age: 64
Discharge: HOME | End: 2019-03-04
Attending: FAMILY MEDICINE | Admitting: FAMILY MEDICINE
Payer: COMMERCIAL

## 2019-03-04 VITALS
TEMPERATURE: 97.9 F | RESPIRATION RATE: 22 BRPM | SYSTOLIC BLOOD PRESSURE: 147 MMHG | HEART RATE: 94 BPM | OXYGEN SATURATION: 97 % | DIASTOLIC BLOOD PRESSURE: 84 MMHG

## 2019-03-04 DIAGNOSIS — S20.229A CONTUSION OF BACK, UNSPECIFIED LATERALITY, INITIAL ENCOUNTER: ICD-10-CM

## 2019-03-04 DIAGNOSIS — S09.90XA MINOR CLOSED HEAD INJURY: ICD-10-CM
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
## 2019-03-04 DIAGNOSIS — S30.0XXA CONTUSION OF COCCYX, INITIAL ENCOUNTER: ICD-10-CM

## 2019-03-04 PROCEDURE — 99284 EMERGENCY DEPT VISIT MOD MDM: CPT | Mod: Z6 | Performed by: FAMILY MEDICINE

## 2019-03-04 PROCEDURE — 99283 EMERGENCY DEPT VISIT LOW MDM: CPT | Performed by: FAMILY MEDICINE

## 2019-03-04 NOTE — DISCHARGE INSTRUCTIONS
You have minor contusions to the head, low back and tailbone.  Ice all sore areas frequently.  Add heat in 2-3 days.  Take ibuprofen up to 600 mg 4 times a day with food for the next 3 days.  Expect to be stiff and sore tomorrow.  Follow-up in the clinic in 3 days if not improving.  Return to the emergency department at any time if your symptoms worsen.

## 2019-03-04 NOTE — ED PROVIDER NOTES
[facility] ED Provider Note   Patient: Daly Walker  MRN #:  [id number]  Date of Visit: March 4, 2019    CC:     Chief Complaint   Patient presents with     Fall     HPI:  Daly Walker is a 63 year old female who presented to the emergency department by EMS after she fell and slipped on the snow and ice outside of her apartment.  Patient did have a code on, and fell backward.  She landed on her tailbone, and has a strain to the back and had a minor injury to the back of the head.  She had no loss of consciousness.  Patient laid on the snow for about 15 minutes, and was upset at the time.  She reported slight frontal headache initially but that has dissipated.  She has no significant neck pain, only slight stiffness.  Most of the pain now is in the low back and tailbone area but she is able to sit and move.  She feels that she was just anxious and upset after the fall.  She does not think that she fractured anything.  Patient has a seated walker at home.  She has a PCA that is going to come get her and bring her home.  Patient states that she has been dealing with left shoulder bursitis and bronchitis.  She has a slight cough.  She has never smoked but has secondhand smoke exposure.    Problem List:  Patient Active Problem List    Diagnosis Date Noted     Sepsis (H) 02/15/2017     Priority: Medium     Influenza A 02/15/2017     Priority: Medium     COPD exacerbation (H) 02/15/2017     Priority: Medium     Constipation 12/05/2016     Priority: Medium     Disorder of tendon 10/25/2016     Priority: Medium     Generalized anxiety disorder 08/01/2016     Priority: Medium     Mild episode of recurrent major depressive disorder (H) 08/01/2016     Priority: Medium     Neoplastic disease 12/19/2015     Priority: Medium     Overview:   two MRI in 2015 show stability and benign type tumor       Obstructive chronic bronchitis with exacerbation (H)  06/02/2014     Priority: Medium     Ankle pain 08/07/2013     Priority: Medium     Left bundle branch block (LBBB) 02/06/2013     Priority: Medium     History of transient cerebral ischemia 02/06/2013     Priority: Medium     Seizure disorder (H) 11/19/2012     Priority: Medium     Overview:   sees Dr. Sarah Garibay with Neurology  check lamictal levels every three months       Dupuytren's contracture 06/25/2012     Priority: Medium     TYPE 2 DIABETES, HBA1C GOAL < 7% 10/31/2010     Priority: Medium     DDD (degenerative disc disease), cervical 12/23/2009     Priority: Medium     Patient is followed by SESAR MARROQUIN for ongoing prescription of narcotic pain medicine.  Med: percocet 10/325 up to 4 per day.   Maximum use per month: 120  Expected duration: ongoing  Narcotic agreement on file: YES  Clinic visit recommended: Q 3 months         Hyperlipidemia LDL goal <100 09/09/2009     Priority: Medium     Brachial neuritis or radiculitis 03/28/2007     Priority: Medium     Problem list name updated by automated process. Provider to review       Essential hypertension, benign 01/31/2007     Priority: Medium     Anxiety state 01/31/2007     Priority: Medium     Problem list name updated by automated process. Provider to review       Lumbago 01/31/2007     Priority: Medium     Type 2 diabetes mellitus (H) 02/14/2006     Priority: Medium       Past Medical History:   Diagnosis Date     Cerebral infarction (H)      Chronic back pain      COPD (chronic obstructive pulmonary disease) (H)      Depressive disorder      Hypertension      Mixed hyperlipidemia      Obesity      Other anxiety states      Seizure (H)      Syncope 8/2/10     Type II or unspecified type diabetes mellitus without mention of complication, uncontrolled        MEDS:   albuterol (2.5 MG/3ML) 0.083% neb solution   albuterol (PROAIR HFA/PROVENTIL HFA/VENTOLIN HFA) 108 (90 BASE) MCG/ACT Inhaler   amLODIPine (NORVASC) 10 MG tablet   aspirin EC 81 MG EC  tablet   atenolol (TENORMIN) 50 MG tablet   beclomethasone (QVAR) 80 MCG/ACT Inhaler   calcium carb 1250 mg, 500 mg [place],/vitamin D 200 units (OSCAL WITH D) 500-200 MG-UNIT per tablet   cetirizine (ZYRTEC) 10 MG tablet   citalopram (CELEXA) 40 MG tablet   exenatide ER (BYDUREON) 2 MG pen   furosemide (LASIX) 20 MG tablet   insulin aspart (NOVOLOG PEN) 100 UNIT/ML injection   insulin glargine U-300 (TOUJEO) 300 UNIT/ML injection   lamoTRIgine (LAMICTAL) 100 MG tablet   levofloxacin (LEVAQUIN) 750 MG tablet   linagliptin (TRADJENTA) 5 MG TABS tablet   lisinopril (PRINIVIL/ZESTRIL) 20 MG tablet   lovastatin (MEVACOR) 20 MG tablet   metFORMIN (GLUCOPHAGE) 1000 MG tablet   omeprazole (PRILOSEC) 20 MG CR capsule   oseltamivir (TAMIFLU) 75 MG capsule   oxyCODONE (ROXICODONE) 5 MG IR tablet   polyethylene glycol (MIRALAX/GLYCOLAX) powder   senna-docusate (SENOKOT-S;PERICOLACE) 8.6-50 MG per tablet       ALLERGIES:  No Known Allergies    Past Surgical History:   Procedure Laterality Date     C/SECTION, CLASSICAL  1972, 1973     CHOLECYSTECTOMY, LAPOROSCOPIC  2000     COLONOSCOPY  05/18/09     HEMORRHOIDECTOMY  03/11/10    Internal and external     HYSTERECTOMY, VAGINAL      sparing ovaries     ORTHOPEDIC SURGERY      foot surgery     TONSILLECTOMY  1955       Social History     Tobacco Use     Smoking status: Never Smoker     Tobacco comment: no smokers in the household   Substance Use Topics     Alcohol use: Yes     Comment: at times     Drug use: No         Review of Systems   Except as noted in HPI, all other systems were reviewed and are negative    Physical Exam   Vitals were reviewed                     GENERAL APPEARANCE: Alert and oriented x3, no apparent distress  HEAD: Atraumatic; normocephalic  FACE: normal facies  EYES: Pupils are equal and reactive to light  HENT: normal external exam  NECK: no adenopathy or asymmetry; excellent range of motion without significant pain  RESP: normal respiratory effort; clear  breath sounds bilaterally  CV: regular rate and rhythm; no significant murmurs, gallops or rubs  ABD: soft, no tenderness; no rebound or guarding; bowel sounds are normal  MS: No significant cervical or thoracic spine tenderness; mild tenderness over the sacral region  EXT: Left wrist with slight tenderness; normal range of motion in the wrist  SKIN: No obvious bruising  NEURO: no facial droop; no focal deficits, speech is normal        Available Lab/Imaging Results   No results found for this or any previous visit (from the past 24 hour(s)).             Impression     Final diagnoses:   Fall due to slipping on ice or snow, initial encounter   Contusion of coccyx   Contusion of back   Minor closed head injury         ED Course & Medical Decision Making   Daly Walker is a 63 year old female who presented to the emergency department by EMS for evaluation of injuries from her fall on the snow and ice.  Patient was walking out of her apartment when she fell backward, landing on her tailbone, buttock, low back, and bumped the back of her head.  She had no loss of consciousness.  She laid on the snow for about 15-20 minutes stating that she was more upset than anything else.  She does not think she fractured anything.  She was brought to the ED for evaluation by EMS.  Patient has a completely normal exam.  She has some tenderness over the sacral/coccyx area.  Patient was offered imaging and additional testing but she feels she could monitor for any new or worsening symptoms.  Ice all sore areas frequently.  Take ibuprofen up to 600 mg 4 times a day as needed for pain.  Recheck in 3 days if not improving.  Return to the ED at any time if symptoms worsen.  Patient has a ride from her PCA.          Written after-visit summary and instructions were given at the time of discharge.    Follow up Plan:   Jamey Chase  [facility]  [address]  [place] [address]  [phone]    In 3 days  if not  improving      Discharge Medications: None       This note was dictated using electronic voice recognition software and although reviewed, may contain grammatical and spelling errors.        Adam Adames MD  03/04/19 6907

## 2019-03-04 NOTE — ED AVS SNAPSHOT
[facility] Emergency Department  [address] [place] [address]  Phone:  [phone]  Fax:  [phone]                                    Daly Walker   MRN: [id number]    Department:  [facility] Emergency Department   Date of Visit:  3/4/2019           After Visit Summary Signature Page    I have received my discharge instructions, and my questions have been answered. I have discussed any challenges I see with this plan with the nurse or doctor.    ..........................................................................................................................................  Patient/Patient Representative Signature      ..........................................................................................................................................  Patient Representative Print Name and Relationship to Patient    ..................................................               ................................................  Date                                   Time    ..........................................................................................................................................  Reviewed by Signature/Title    ...................................................              ..............................................  Date                                               Time          22EPIC Rev 08/18

## 2019-04-06 ENCOUNTER — HOSPITAL ENCOUNTER (EMERGENCY)
Facility: CLINIC | Age: 64
Discharge: HOME | End: 2019-04-06
Attending: FAMILY MEDICINE | Admitting: FAMILY MEDICINE
Payer: COMMERCIAL

## 2019-04-06 ENCOUNTER — NURSE TRIAGE (OUTPATIENT)
Dept: NURSING | Facility: CLINIC | Age: 64
End: 2019-04-06

## 2019-04-06 ENCOUNTER — APPOINTMENT (OUTPATIENT)
Dept: GENERAL RADIOLOGY | Facility: CLINIC | Age: 64
End: 2019-04-06
Attending: FAMILY MEDICINE
Payer: COMMERCIAL

## 2019-04-06 VITALS
OXYGEN SATURATION: 94 % | SYSTOLIC BLOOD PRESSURE: 142 MMHG | TEMPERATURE: 99 F | DIASTOLIC BLOOD PRESSURE: 92 MMHG | RESPIRATION RATE: 20 BRPM | BODY MASS INDEX: 34.33 KG/M2 | HEART RATE: 95 BPM | WEIGHT: 200 LBS

## 2019-04-06 DIAGNOSIS — J20.9 ACUTE BRONCHITIS, UNSPECIFIED ORGANISM: ICD-10-CM

## 2019-04-06 PROCEDURE — 71046 X-RAY EXAM CHEST 2 VIEWS: CPT | Mod: TC

## 2019-04-06 PROCEDURE — 25000125 ZZHC RX 250: Performed by: FAMILY MEDICINE

## 2019-04-06 PROCEDURE — 94640 AIRWAY INHALATION TREATMENT: CPT

## 2019-04-06 PROCEDURE — 99284 EMERGENCY DEPT VISIT MOD MDM: CPT | Mod: Z6 | Performed by: FAMILY MEDICINE

## 2019-04-06 PROCEDURE — 99283 EMERGENCY DEPT VISIT LOW MDM: CPT | Mod: 25 | Performed by: FAMILY MEDICINE

## 2019-04-06 RX ORDER — ALBUTEROL SULFATE 0.83 MG/ML
2.5 SOLUTION RESPIRATORY (INHALATION) EVERY 6 HOURS PRN
Qty: 1 BOX | Refills: 0 | Status: SHIPPED | OUTPATIENT
Start: 2019-04-06

## 2019-04-06 RX ORDER — IPRATROPIUM BROMIDE AND ALBUTEROL SULFATE 2.5; .5 MG/3ML; MG/3ML
3 SOLUTION RESPIRATORY (INHALATION)
Status: DISCONTINUED | OUTPATIENT
Start: 2019-04-06 | End: 2019-04-06 | Stop reason: HOSPADM

## 2019-04-06 RX ORDER — PREDNISONE 20 MG/1
TABLET ORAL
Qty: 10 TABLET | Refills: 0 | Status: SHIPPED | OUTPATIENT
Start: 2019-04-06

## 2019-04-06 RX ADMIN — IPRATROPIUM BROMIDE AND ALBUTEROL SULFATE 3 ML: .5; 3 SOLUTION RESPIRATORY (INHALATION) at 12:32

## 2019-04-06 NOTE — ED PROVIDER NOTES
History     Chief Complaint   Patient presents with     Cough     HPI  Daly Walker is a 63 year old female who presents with concerns of continued cough and wheezing.  Patient was seen about a week ago and placed on a course of Zithromax.  She had inhalers and nebulizers that she has been using at home.  She is concerned because things have not improved.  She was told by someone at home that she should come in to be checked for mold exposure and to get an x-ray.  Patient denies any new fevers or chills.  Patient states that the cough has been productive.  Patient denies any chest pain or abdominal pain or back pain.  Patient states that the inhalers only help for short period of time.    Allergies:  No Known Allergies    Problem List:    Patient Active Problem List    Diagnosis Date Noted     Sepsis (H) 02/15/2017     Priority: Medium     Influenza A 02/15/2017     Priority: Medium     COPD exacerbation (H) 02/15/2017     Priority: Medium     Constipation 12/05/2016     Priority: Medium     Disorder of tendon 10/25/2016     Priority: Medium     Generalized anxiety disorder 08/01/2016     Priority: Medium     Mild episode of recurrent major depressive disorder (H) 08/01/2016     Priority: Medium     Neoplastic disease 12/19/2015     Priority: Medium     Overview:   two MRI in 2015 show stability and benign type tumor       Obstructive chronic bronchitis with exacerbation (H) 06/02/2014     Priority: Medium     Ankle pain 08/07/2013     Priority: Medium     Left bundle branch block (LBBB) 02/06/2013     Priority: Medium     History of transient cerebral ischemia 02/06/2013     Priority: Medium     Seizure disorder (H) 11/19/2012     Priority: Medium     Overview:   sees Dr. Sarah Garibay with Neurology  check lamictal levels every three months       Dupuytren's contracture 06/25/2012     Priority: Medium     TYPE 2 DIABETES, HBA1C GOAL < 7% 10/31/2010     Priority: Medium     DDD (degenerative disc disease),  cervical 12/23/2009     Priority: Medium     Patient is followed by SESAR MARROQUIN for ongoing prescription of narcotic pain medicine.  Med: percocet 10/325 up to 4 per day.   Maximum use per month: 120  Expected duration: ongoing  Narcotic agreement on file: YES  Clinic visit recommended: Q 3 months         Hyperlipidemia LDL goal <100 09/09/2009     Priority: Medium     Brachial neuritis or radiculitis 03/28/2007     Priority: Medium     Problem list name updated by automated process. Provider to review       Essential hypertension, benign 01/31/2007     Priority: Medium     Anxiety state 01/31/2007     Priority: Medium     Problem list name updated by automated process. Provider to review       Lumbago 01/31/2007     Priority: Medium     Type 2 diabetes mellitus (H) 02/14/2006     Priority: Medium        Past Medical History:    Past Medical History:   Diagnosis Date     Cerebral infarction (H)      Chronic back pain      COPD (chronic obstructive pulmonary disease) (H)      Depressive disorder      Hypertension      Mixed hyperlipidemia      Obesity      Other anxiety states      Seizure (H)      Syncope 8/2/10     Type II or unspecified type diabetes mellitus without mention of complication, uncontrolled        Past Surgical History:    Past Surgical History:   Procedure Laterality Date     C/SECTION, CLASSICAL  1972, 1973     CHOLECYSTECTOMY, LAPOROSCOPIC  2000     COLONOSCOPY  05/18/09     HEMORRHOIDECTOMY  03/11/10    Internal and external     HYSTERECTOMY, VAGINAL      sparing ovaries     ORTHOPEDIC SURGERY      foot surgery     TONSILLECTOMY  1955       Family History:    Family History   Problem Relation Age of Onset     Diabetes Mother         type II     Cardiovascular Mother         CHF     Heart Disease Mother         CHF     Diabetes Sister         type II     Diabetes Sister         type 2     Diabetes Maternal Grandmother      Cancer Maternal Grandfather      Unknown/Adopted Paternal  Grandmother      Unknown/Adopted Paternal Grandfather      Diabetes Maternal Aunt         type II     Diabetes Other         cousin on maternal side, type II     Asthma No family hx of      C.A.D. No family hx of      Hypertension No family hx of      Cerebrovascular Disease No family hx of      Breast Cancer No family hx of      Cancer - colorectal No family hx of      Prostate Cancer No family hx of      Alzheimer Disease No family hx of      Arthritis No family hx of      Blood Disease No family hx of      Circulatory No family hx of      Eye Disorder No family hx of      Gastrointestinal Disease No family hx of      Genitourinary Problems No family hx of      Lipids No family hx of      Musculoskeletal Disorder No family hx of      Neurologic Disorder No family hx of      Respiratory No family hx of      Thyroid Disease No family hx of        Social History:  Marital Status:  Single [1]  Social History     Tobacco Use     Smoking status: Never Smoker     Tobacco comment: no smokers in the household   Substance Use Topics     Alcohol use: Yes     Comment: at times     Drug use: No        Medications:      cetirizine (ZYRTEC) 10 MG tablet   albuterol (2.5 MG/3ML) 0.083% neb solution   albuterol (PROAIR HFA/PROVENTIL HFA/VENTOLIN HFA) 108 (90 BASE) MCG/ACT Inhaler   amLODIPine (NORVASC) 10 MG tablet   aspirin EC 81 MG EC tablet   atenolol (TENORMIN) 50 MG tablet   beclomethasone (QVAR) 80 MCG/ACT Inhaler   calcium carb 1250 mg, 500 mg [place],/vitamin D 200 units (OSCAL WITH D) 500-200 MG-UNIT per tablet   citalopram (CELEXA) 40 MG tablet   exenatide ER (BYDUREON) 2 MG pen   furosemide (LASIX) 20 MG tablet   insulin aspart (NOVOLOG PEN) 100 UNIT/ML injection   insulin glargine U-300 (TOUJEO) 300 UNIT/ML injection   lamoTRIgine (LAMICTAL) 100 MG tablet   linagliptin (TRADJENTA) 5 MG TABS tablet   lisinopril (PRINIVIL/ZESTRIL) 20 MG tablet   lovastatin (MEVACOR) 20 MG tablet   metFORMIN (GLUCOPHAGE) 1000 MG tablet    omeprazole (PRILOSEC) 20 MG CR capsule   oxyCODONE (ROXICODONE) 5 MG IR tablet   polyethylene glycol (MIRALAX/GLYCOLAX) powder   senna-docusate (SENOKOT-S;PERICOLACE) 8.6-50 MG per tablet         Review of Systems   All other systems reviewed and are negative.      Physical Exam   BP: (!) 149/98  Pulse: 86  Temp: 99  F (37.2  C)  Resp: 24  Weight: 90.7 kg (200 lb)  SpO2: 92 %      Physical Exam   Constitutional: She is oriented to person, place, and time. She appears well-developed and well-nourished. No distress.   HENT:   Head: Normocephalic and atraumatic.   Nose: Nose normal.   Mouth/Throat: Oropharynx is clear and moist. No oropharyngeal exudate.   Eyes: Conjunctivae are normal.   Neck: Normal range of motion. Neck supple.   Cardiovascular: Normal rate, regular rhythm and normal heart sounds. Exam reveals no friction rub.   No murmur heard.  Pulmonary/Chest: Effort normal and breath sounds normal. No stridor. No respiratory distress. She has no wheezes. She has no rales.   Abdominal: Soft. Bowel sounds are normal. She exhibits no distension and no mass. There is no tenderness. There is no guarding.   Musculoskeletal: Normal range of motion. She exhibits no tenderness.   Neurological: She is alert and oriented to person, place, and time.   Skin: Skin is warm and dry. Capillary refill takes less than 2 seconds. She is not diaphoretic. No erythema.   Psychiatric: Judgment normal.   Nursing note and vitals reviewed.      ED Course        Procedures             Results for orders placed or performed during the hospital encounter of 04/06/19 (from the past 24 hour(s))   XR Chest 2 Views    Narrative    CHEST TWO VIEWS  4/6/2019 12:29 PM     HISTORY: 63-year-old woman with cough and shortness of breath.       Impression    IMPRESSION: Since February 16, 2017, heart size is upper limit of  normal. No pleural effusion, pneumothorax, or abnormal area of  consolidation.    JOHNATHAN DE PAZ MD       Medications    ipratropium - albuterol 0.5 mg/2.5 mg/3 mL (DUONEB) neb solution 3 mL (3 mLs Nebulization Given 4/6/19 5892)     Chest x-ray was normal.  Neb did seem to help but she was moving more air and not coughing as much.  I did explain to her that unfortunately we cannot do a blood test to check for mold exposure.  I encouraged her to talk to her landlord about having this checked.  I think the patient is still having some bronchospasm and would benefit from a course of prednisone.  We will start the patient on a course of this to take daily for the next 5 days.  She also stated that her albuterol medicine at home is outdated so I will prescribe her some albuterol to use at home.  Patient will follow-up with her doctor in the next 3-5 days if things are not improving.    Assessments & Plan (with Medical Decision Making)  Acute bronchitis     I have reviewed the nursing notes.    I have reviewed the findings, diagnosis, plan and need for follow up with the patient.              4/6/2019   [facility] EMERGENCY DEPARTMENT     Dillon Medina MD  04/06/19 0927

## 2019-04-06 NOTE — PROGRESS NOTES
Pt is wondering if she can get a prescription for nebulizer medication for at home.  Pt does have a machine at home already.    Shaan Shah, RT on 4/6/2019 at 12:37 PM

## 2019-04-06 NOTE — ED TRIAGE NOTES
Presents from home with cough and wheezing.  Patient is able to talk in full sentences at length.  She was told she should have an xray because of the cough and wheeze in addition to concerns for mold in her apartment building.

## 2019-04-06 NOTE — TELEPHONE ENCOUNTER
Patient thinks she may have pneumonia and wants to know what to do.  Was on a Z pack and that didn't help. Still has a deep productive  cough and having trouble breathing at times. It makes her ribs sore to cough. Has a sore throat.  Recommends that she go to urgent care or the ER to be evaluated.    Raquel Rodriguez RN/ [facility]        Reason for Disposition    Wheezing is present    Additional Information    Negative: Severe difficulty breathing (e.g., struggling for each breath, speaks in single words)    Negative: Bluish lips, tongue, or face now    Negative: [1] Difficulty breathing AND [2] exposure to flames, smoke, or fumes    Negative: [1] Stridor AND [2] difficulty breathing    Negative: Sounds like a life-threatening emergency to the triager    Negative: Chest pain  (Exception: MILD central chest pain, present only when coughing)    Negative: Difficulty breathing    Negative: Patient sounds very sick or weak to the triager    Negative: [1] Coughed up blood AND [2] > 1 tablespoon (15 ml) (Exception: blood-tinged sputum)    Negative: Fever > 103 F (39.4 C)    Negative: [1] Fever > 101 F (38.3 C) AND [2] age > 60    Negative: [1] Fever > 101 F (38.3 C) AND [2] bedridden (e.g., nursing home patient, CVA, chronic illness, recovering from surgery)    Negative: [1] Fever > 100.5 F (38.1 C) AND [2] diabetes mellitus or weak immune system (e.g., HIV positive, cancer chemo, splenectomy, organ transplant, chronic steroids)    Protocols used: COUGH - ACUTE PRODUCTIVE-ADULT-

## 2019-04-06 NOTE — ED AVS SNAPSHOT
[facility] Department  [address] [place] [address]  Phone:  [phone]  Fax:  [phone]                                    Daly Walker   MRN: [id number]    Department:  [facility] Department   Date of Visit:  4/6/2019           After Visit Summary Signature Page    I have received my discharge instructions, and my questions have been answered. I have discussed any challenges I see with this plan with the nurse or doctor.    ..........................................................................................................................................  Patient/Patient Representative Signature      ..........................................................................................................................................  Patient Representative Print Name and Relationship to Patient    ..................................................               ................................................  Date                                   Time    ..........................................................................................................................................  Reviewed by Signature/Title    ...................................................              ..............................................  Date                                               Time          22EPIC Rev 08/18